# Patient Record
Sex: MALE | Race: WHITE | HISPANIC OR LATINO | Employment: FULL TIME | URBAN - NONMETROPOLITAN AREA
[De-identification: names, ages, dates, MRNs, and addresses within clinical notes are randomized per-mention and may not be internally consistent; named-entity substitution may affect disease eponyms.]

---

## 2017-04-17 ENCOUNTER — COMMUNICATION - GICH (OUTPATIENT)
Dept: INTERNAL MEDICINE | Facility: OTHER | Age: 32
End: 2017-04-17

## 2017-04-17 DIAGNOSIS — S16.1XXA STRAIN OF MUSCLE, FASCIA AND TENDON AT NECK LEVEL, INITIAL ENCOUNTER: ICD-10-CM

## 2017-09-07 ENCOUNTER — OFFICE VISIT - GICH (OUTPATIENT)
Dept: INTERNAL MEDICINE | Facility: OTHER | Age: 32
End: 2017-09-07

## 2017-09-07 ENCOUNTER — HISTORY (OUTPATIENT)
Dept: INTERNAL MEDICINE | Facility: OTHER | Age: 32
End: 2017-09-07

## 2017-09-07 ENCOUNTER — AMBULATORY - GICH (OUTPATIENT)
Dept: INTERNAL MEDICINE | Facility: OTHER | Age: 32
End: 2017-09-07

## 2017-09-07 DIAGNOSIS — E78.5 HYPERLIPIDEMIA: ICD-10-CM

## 2017-09-07 DIAGNOSIS — R20.2 PARESTHESIA OF SKIN: ICD-10-CM

## 2017-09-07 DIAGNOSIS — Z86.39 PERSONAL HISTORY OF OTHER ENDOCRINE, NUTRITIONAL AND METABOLIC DISEASE: ICD-10-CM

## 2017-09-07 DIAGNOSIS — Z72.0 TOBACCO USE: ICD-10-CM

## 2017-09-07 DIAGNOSIS — R73.09 OTHER ABNORMAL GLUCOSE: ICD-10-CM

## 2017-09-07 LAB
ESTIMATED AVERAGE GLUCOSE: 114 MG/DL
HEMOGLOBIN A1C MONITORING (POCT) - HISTORICAL: 5.6 % (ref 4–6.2)

## 2017-09-07 ASSESSMENT — PATIENT HEALTH QUESTIONNAIRE - PHQ9: SUM OF ALL RESPONSES TO PHQ QUESTIONS 1-9: 0

## 2017-09-08 ENCOUNTER — COMMUNICATION - GICH (OUTPATIENT)
Dept: PHARMACY | Facility: OTHER | Age: 32
End: 2017-09-08

## 2017-09-08 DIAGNOSIS — S16.1XXA STRAIN OF MUSCLE, FASCIA AND TENDON AT NECK LEVEL, INITIAL ENCOUNTER: ICD-10-CM

## 2017-12-27 NOTE — PROGRESS NOTES
Patient Information     Patient Name MRN Sex Sanjay Pires [id number] Male 1985      Progress Notes by All Kunz MD at 2017  2:40 PM     Author:  All Kunz MD Service:  (none) Author Type:  Physician     Filed:  2017  5:32 PM Encounter Date:  2017 Status:  Signed     :  All Kunz MD (Physician)            Nursing Notes:   Ness Edgar  2017  3:18 PM  Signed  Patient presents to the clinic for medication management.    Previous A1C is at goal of <8  HEMOGLOBIN A1C MONITORING (POCT) (%)    Date Value   2016 7.6 (H)     Urine microalbumin:creatine: 0.29  Foot exam unknown  Eye exam with in the past 6 months    Patient is a current smoker  Patient is not on a daily aspirin  Patient is on a Statin.  Blood pressure today of   is at the goal of <139/89 for diabetics.    Ness Edgar LPN..............2017 2:58 PM    Sanjay Hoskins presents to clinic today for:   Chief Complaint    Patient presents with      Medication Management     HPI: Mr. Hoskins is a 31 y.o. male who presents today for evaluation of above.     (R73.09) Elevated random blood glucose level  (primary encounter diagnosis)  (E78.5) Dyslipidemia  (Z72.0) Tobacco use  (R20.2) Pins and needles sensation  (Z86.39) History of diabetes mellitus, type II     Diabetes mellitus type 2, currently resolved.  -- Reports that his new job he has lost about 20 pounds.  He was taking combination glipizide and metformin for a while but then he started having hypoglycemia episodes.  His diet has changed substantially.  He's been eating a lot less during the day and and he has been more active.  -- Repeat labs today show hemoglobin A1c now within the normal range.  HEMOGLOBIN A1C MONITORING (POCT) (%)    Date Value   2017 5.6     dyslipidemia, continues on pravastatin.  Seems to be tolerating this well.  Refill today.    Tobacco use, ongoing.  Quarter pack per day.  At this point diabetes has resolved.   Advised patient continue metformin alone, for diabetes prevention.    Pins and needle sensation of the hands and feet -- advised to continue metformin, consider taking some vitamin B-12 or B complex to help with potential B12 deficiency from the metformin.    Mr. Blackman Body mass index is 28.46 kg/(m^2). This is out of the normal range for a 31 y.o. Normal range for ages 18+ is between 18.5 and 24.9. To lose weight we reviewed risks and benefits of appropriate options such as diet, exercise, and medications. Patient's strategy will be  self-directed nutrition plan and self-directed exercise program   BP Readings from Last 1 Encounters:09/07/17 : 138/88  Mr. Blackman blood pressure is out of the normal range for adults. Per JNC-8 guidelines normal adult blood pressure is < 120/80, pre-hypertensive is between 120/80 and 139/89, and hypertension is 140/90 or greater. Risks of hypertension were discussed. Patient's strategy will be weight loss, increased activity and reduced salt intake    Functional Capacity: > 4 METS.   Reports that he can climb a flight of stairs without any chest pain/heaviness or shortness of breath.   Patient reports no current symptoms of fevers, chills, nausea/vomiting.   No cough. No shortness of breath.   No change in bowel/bladder habits. No melena, hematochezia. No Hematuria.   No rashes. No palpitations.  No orthopnea/paroxysmal nocturnal dyspnea   No vision or hearing issues.   No significant mood issues   No bruising.     HUSSEIN:  No flowsheet data found.    PHQ9:  PHQ Depression Screening 8/24/2016 9/7/2017   Date of PHQ exam (doc flow) 8/24/2016 9/7/2017   1. Lack of interest/pleasure 0 - Not at all 0 - Not at all   2. Feeling down/depressed 0 - Not at all 0 - Not at all   PHQ-2 TOTAL SCORE 0 0   3. Trouble sleeping 0 - Not at all 0 - Not at all   4. Decreased energy 0 - Not at all 0 - Not at all   5. Appetite change 0 - Not at all 0 - Not at all   6. Feelings of failure 0 - Not at all 0 -  Not at all   7. Trouble concentrating 0 - Not at all 0 - Not at all   8. Activity level 0 - Not at all 0 - Not at all   9. Hurting yourself 0 - Not at all 0 - Not at all   PHQ-9 TOTAL SCORE 0 0   PHQ-9 Severity Level none none   Functional Impairment not difficult at all not difficult at all   Some recent data might be hidden          I have personally reviewed the past medical history, past surgical history, medications, allergies, family and social history as listed below, on 9/7/2017.    Patient Active Problem List      Diagnosis Date Noted     Tobacco use 09/07/2017     History of diabetes mellitus, type II 09/07/2017     History of neck pain 08/24/2016     Heartburn 09/22/2015     Dyslipidemia 09/22/2015     Past Medical History:     Diagnosis  Date     Dyslipidemia 9/22/2015     History of nicotine dependence 9/2/2015     No past surgical history on file.  Current Outpatient Prescriptions       Medication  Sig Dispense Refill     aspirin (ECOTRIN) 81 mg enteric coated tablet Take 1 tablet by mouth once daily with a meal. -- just a couple times weekly --  0     blood sugar diagnostic strip Dispense item covered by pt ins. 250.02 NIDDM type II, uncontrolled - Test 4 times/day. Reason: High A1C and NEW Diabetes 400 Each 1     blood-glucose meter Dispense item covered by pt ins. 250.02 NIDDM type II, uncontrolled - Test 4 times/day. Reason: High A1C and NEW Diabetes 1 Device 0     cyanocobalamin (VITAMIN B-12) 1,000 mcg tablet Take 1 tablet by mouth once daily. -- For low vitamin B 12 30 tablet 11     cyclobenzaprine (FLEXERIL) 10 mg tablet Take 1 tablet by mouth 3 times daily if needed for Muscle Spasm. 90 tablet 3     lancets Dispense item covered by pt ins. 250.02 NIDDM type II, uncontrolled - Test 4 times/day. Reason: High A1C and NEW Diabetes 400 Each 1     metFORMIN (GLUCOPHAGE) 500 mg tablet Take 1-2 tablets by mouth 2 times daily with meals. - adjust dose based on glucose levels 120 tablet 11      "omeprazole (PRILOSEC) 20 mg Delayed-Release capsule Take 1 capsule by mouth once daily before a meal. 90 capsule 3     pravastatin (PRAVACHOL) 40 mg tablet Take 1 tablet by mouth at bedtime. For Cholesterol and Heart Attack Risk Reduction -- check on cost/coverage vs other statin 30 tablet 11     Allergies      Allergen   Reactions     Amoxicillin  Other - Describe In Comment Field     Too young with onset      Augmentin [Amoxicillin-Pot Clavulanate]  Other - Describe In Comment Field     Patient too young to remember      Suprax [Cefixime]  Other - Describe In Comment Field     Too young when onset      Family History       Problem   Relation Age of Onset     Diabetes  Mother      Good Health  Father      COPD       Arthritis  Father      RA?       Family Status     Relation  Status     Mother Alive     Father Alive     Sister Alive     Social History     Social History        Marital status:  Single     Spouse name: N/A     Number of children:  N/A     Years of education:  N/A     Social History Main Topics          Smoking status:   Current Some Day Smoker      Packs/day:  0.25      Years:  6.00      Last attempt to quit:  3/2/2015      Smokeless tobacco:   Never Used      Alcohol use   3.6 oz/week     6 Standard drinks or equivalent per week        Comment: About once a week, beer       Drug use:   No      Sexual activity:   Not on file      Other Topics  Concern     Seat Belt Yes     Social History Narrative     Lives independently.     Worked at [employer] in [place]    -- working at [employer] in [place].      Pertinent ROS was performed and was negative as noted in HPI above.     EXAM:   Vitals:     09/07/17 1507   BP: 138/88   Pulse: 72   Weight: 90 kg (198 lb 6 oz)   Height: 1.778 m (5' 10\")     BP Readings from Last 3 Encounters:    09/07/17 138/88   08/24/16 132/68   06/01/16 126/66     Wt Readings from Last 3 Encounters:    09/07/17 90 kg (198 lb 6 oz)   08/24/16 97.7 kg (215 lb 6.4 oz) " "  06/01/16 105.9 kg (233 lb 6.4 oz)     Estimated body mass index is 28.46 kg/(m^2) as calculated from the following:    Height as of this encounter: 1.778 m (5' 10\").    Weight as of this encounter: 90 kg (198 lb 6 oz).     EXAM:  Constitutional: Pleasant, alert, appropriate appearance for age. No acute distress  ENT: Normocephalic, Atraumatic, Thyroid without nodules or tenderness  Lymphatic Exam: Non-palpable nodes in neck, clavicular regions  Pulmonary: Lungs are clear to auscultation bilaterally, without wheezes or crackles  Cardiovascular Exam: regular rate and rhythm, no pedal edema  Gastrointestinal Exam: Obese, Soft, non-tender, non-distended, positive bowel sounds  Integument: No abnormal rashes, sores, or ulcerations noted  Neurologic Exam: CN 3-12 grossly intact   Musculoskeletal Exam: Moves upper and lower extremities symmetrically, No focal weakness  Gait and station appear grossly normal  Psychiatric Exam: Awake and Alert, Affect and mood appropriate  Speech is fluent, Thought process is normal    INVESTIGATIONS:  Results for orders placed or performed in visit on 09/07/17      HEMOGLOBIN A1C MONITORING (POCT)      Result  Value Ref Range    HEMOGLOBIN A1C MONITORING (POCT) 5.6 4.0 - 6.2 %    ESTIMATED AVERAGE GLUCOSE  114 mg/dL       ASSESSMENT AND PLAN:  Sanjay was seen today for medication management.    Diagnoses and all orders for this visit:    Elevated random blood glucose level  -     pravastatin (PRAVACHOL) 40 mg tablet; Take 1 tablet by mouth at bedtime. For Cholesterol and Heart Attack Risk Reduction -- check on cost/coverage vs other statin  -     metFORMIN (GLUCOPHAGE) 500 mg tablet; Take 1-2 tablets by mouth 2 times daily with meals. - adjust dose based on glucose levels  -     HEMOGLOBIN A1C MONITORING (POCT); Standing  -     HEMOGLOBIN A1C MONITORING (POCT)    Dyslipidemia  -     pravastatin (PRAVACHOL) 40 mg tablet; Take 1 tablet by mouth at bedtime. For Cholesterol and Heart Attack " Risk Reduction -- check on cost/coverage vs other statin    Tobacco use    Pins and needles sensation  -     cyanocobalamin (VITAMIN B-12) 1,000 mcg tablet; Take 1 tablet by mouth once daily. -- For low vitamin B 12    History of diabetes mellitus, type II    Other orders  -     Cancel: Hgb A1c; Future    lab results and schedule of future lab studies reviewed with patient, reviewed diet, exercise and weight control, very strongly urged to quit smoking to reduce cardiovascular risk, cardiovascular risk and specific lipid/LDL goals reviewed, specific diabetic recommendations low cholesterol diet, weight control and daily exercise discussed, home glucose monitoring emphasized, foot care discussed and Podiatry visits discussed, annual eye examinations at Ophthalmology discussed, glycohemoglobin and other lab monitoring discussed and long term diabetic complications discussed, use of aspirin to prevent MI and TIA's discussed    -- Expected clinical course discussed   -- Medications and their side effects discussed    Sanjay is also recommended to eat a heart-healthy diet, do regular aerobic exercises, maintain a desirable body weight, and avoid tobacco products. These recommendations are from the American Heart Association (AHA) which stresses the importance of lifestyle changes to lower cardiovascular disease risk.     Return in about 3 months (around 12/7/2017) for -- labs in 91+ days with Diabetes clinic appointment with Dr. Kunz 1-2 days later..    Patient Instructions     QUIT SMOKING!!     -- Choose a quit date (within 1 month). Quitting smoking abruptly is more successful than gradually cutting back.   -- Tell everyone about it (friends, family, coworkers)   -- Think about when you smoke the most, and what you'll do during those times (eg when in the car, work breaks, etc)     Consider:   -- Start varenicline (Chantix) 1 week before your quit date   -- Start bupropion (Wellbutrin/Zyban) 1 week before your quit  date -- Welbutrin 1 pill daily for 1 week then 1 pill twice daily      -- Stop smoking on quit date   -- Starting with quit date, use nicotine lozenges/gum as needed for cravings     -- Quit Plan - Call them in the next 1-2 weeks to help you quit smoking.                        [phone] ([phone])                        http://[email or web address]   -- http://smokefree.gov/       A1c today.   Medications refilled.    -- change to regular Metformin (stop glipizide).     -- call when you quit smoking and we will update your chart.       Vitamin B12 deficiency:    Vitamin B12 deficiency can cause numerous symptoms.  Fatigue and malaise, low energy levels, low blood counts or anemia, poor mood or depression, neuropathy or pins and needles/burning sensation of the hands and feet.    -- Omeprazole (Proton Pump Inhibitors in general) and metformin can lower B12 levels (inhibits absorption).    -- START B12 tablet -- once daily.     -- Some people are able to take and absorb oral B12 or B complex tablets.   -- Some people are NOT able to absorb oral B12 very well.    If you decide to proceed with oral B12 replacement, but your B12 levels do not improve, you likely will need to use B12 shots instead.      -- Call with update on this B12 shot in 2 to 4 weeks.     -- If it didn't help, we won't do anymore.    -- If it was helpful, we can order more.             Return for Diabetes labs and clinic follow-up appointment every 3 to 4 months.  --- (Go for about 91 to 100 days)    Schedule lab only appointment --- A few days AFTER: 12/06/17     Schedule clinic appointment with Dr. Kunz -- Same day as labs, or 1-2 days later.     Insurance companies are now grading you and I on your blood sugar control -- Goal is to get your A1c down to 7.9% or lower and NO Smoking!    -- Medicare and most insurance companies, will only cover Hemoglobin A1c labs to be rechecked every 91+ days.      HEMOGLOBIN A1C MONITORING (POCT) (%)    Date Value    08/24/2016 7.6 (H)        Next follow-up appointment with Dr. Kunz should be scheduled:  -- Approximately a few days AFTER: 12/06/17      All Kunz MD

## 2017-12-28 NOTE — PATIENT INSTRUCTIONS
Patient Information     Patient Name MRN Sanjay Lawsno [id number] Male 1985      Patient Instructions by All Kunz MD at 2017  2:40 PM     Author:  All Kunz MD  Service:  (none) Author Type:  Physician     Filed:  2017  3:25 PM  Encounter Date:  2017 Status:  Addendum     :  All Kunz MD (Physician)        Related Notes: Original Note by All Kunz MD (Physician) filed at 2017  3:23 PM            QUIT SMOKING!!     -- Choose a quit date (within 1 month). Quitting smoking abruptly is more successful than gradually cutting back.   -- Tell everyone about it (friends, family, coworkers)   -- Think about when you smoke the most, and what you'll do during those times (eg when in the car, work breaks, etc)     Consider:   -- Start varenicline (Chantix) 1 week before your quit date   -- Start bupropion (Wellbutrin/Zyban) 1 week before your quit date -- Welbutrin 1 pill daily for 1 week then 1 pill twice daily      -- Stop smoking on quit date   -- Starting with quit date, use nicotine lozenges/gum as needed for cravings     -- Quit Plan - Call them in the next 1-2 weeks to help you quit smoking.                        [phone]                        http://www.quitplan.com   -- http://smokefree.gov/       A1c today.   Medications refilled.    -- change to regular Metformin (stop glipizide).     -- call when you quit smoking and we will update your chart.       Vitamin B12 deficiency:    Vitamin B12 deficiency can cause numerous symptoms.  Fatigue and malaise, low energy levels, low blood counts or anemia, poor mood or depression, neuropathy or pins and needles/burning sensation of the hands and feet.    -- Omeprazole (Proton Pump Inhibitors in general) and metformin can lower B12 levels (inhibits absorption).    -- START B12 tablet -- once daily.     -- Some people are able to take and absorb oral B12 or B complex tablets.   -- Some people  are NOT able to absorb oral B12 very well.    If you decide to proceed with oral B12 replacement, but your B12 levels do not improve, you likely will need to use B12 shots instead.      -- Call with update on this B12 shot in 2 to 4 weeks.     -- If it didn't help, we won't do anymore.    -- If it was helpful, we can order more.             Return for Diabetes labs and clinic follow-up appointment every 3 to 4 months.  --- (Go for about 91 to 100 days)    Schedule lab only appointment --- A few days AFTER: 12/06/17     Schedule clinic appointment with Dr. Kunz -- Same day as labs, or 1-2 days later.     Insurance companies are now grading you and I on your blood sugar control -- Goal is to get your A1c down to 7.9% or lower and NO Smoking!    -- Medicare and most insurance companies, will only cover Hemoglobin A1c labs to be rechecked every 91+ days.      HEMOGLOBIN A1C MONITORING (POCT) (%)    Date Value   08/24/2016 7.6 (H)        Next follow-up appointment with Dr. Kunz should be scheduled:  -- Approximately a few days AFTER: 12/06/17

## 2017-12-28 NOTE — TELEPHONE ENCOUNTER
Patient Information     Patient Name MRN Sex Sanjay Pires [id number] Male 1985      Telephone Encounter by Ruby Ramon RPh at 2017  2:07 PM     Author:  Ruby Ramon RPh Service:  (none) Author Type:  PHARM- Pharmacist     Filed:  2017  2:08 PM Encounter Date:  2017 Status:  Signed     :  Ruby Ramon RPh (PHARM- Pharmacist)            Pt would like a refill of cyclobenzaprine. Please send to [facility] pharmacy if approved. Thanks.

## 2017-12-30 NOTE — NURSING NOTE
Patient Information     Patient Name MRN Sex Sanjay Pires [id number] Male 1985      Nursing Note by Ness Edgar at 2017  2:40 PM     Author:  Ness Edgar Service:  (none) Author Type:  (none)     Filed:  2017  3:18 PM Encounter Date:  2017 Status:  Signed     :  Ness Edgar            Patient presents to the clinic for medication management.    Previous A1C is at goal of <8  HEMOGLOBIN A1C MONITORING (POCT) (%)    Date Value   2016 7.6 (H)     Urine microalbumin:creatine: 0.29  Foot exam unknown  Eye exam with in the past 6 months    Patient is a current smoker  Patient is not on a daily aspirin  Patient is on a Statin.  Blood pressure today of   is at the goal of <139/89 for diabetics.    Ness Edgar LPN..............2017 2:58 PM

## 2018-01-04 NOTE — TELEPHONE ENCOUNTER
Patient Information     Patient Name MRN Sex Sanjay Pires [id number] Male 1985      Telephone Encounter by Noel Spangler RN at 2017  8:34 AM     Author:  Noel Spangler RN Service:  (none) Author Type:  NURS- Registered Nurse     Filed:  2017  8:36 AM Encounter Date:  2017 Status:  Signed     :  Noel Spangler RN (NURS- Registered Nurse)            Medication is not on refill protocol. Unable to complete prescription refill per RN Medication Refill Policy.................... NOEL SPANGLER RN ....................  2017   8:35 AM        This is a Refill request from: [facility]  Name of Medication: Flexeril 10mg  Quantity requested: 30  Last fill date: 2/15/17  Last visit with BENOIT KUNZ was on: 11/10/2015 in [facility]  PCP:  Benoit Kunz MD  Controlled Substance Agreement:  none   Diagnosis r/t this medication request: cervical strain

## 2018-01-26 VITALS
SYSTOLIC BLOOD PRESSURE: 138 MMHG | BODY MASS INDEX: 28.4 KG/M2 | HEIGHT: 70 IN | DIASTOLIC BLOOD PRESSURE: 88 MMHG | WEIGHT: 198.38 LBS | HEART RATE: 72 BPM

## 2018-02-01 ASSESSMENT — PATIENT HEALTH QUESTIONNAIRE - PHQ9: SUM OF ALL RESPONSES TO PHQ QUESTIONS 1-9: 0

## 2018-02-05 ENCOUNTER — HISTORY (OUTPATIENT)
Dept: INTERNAL MEDICINE | Facility: OTHER | Age: 33
End: 2018-02-05

## 2018-02-05 ENCOUNTER — OFFICE VISIT - GICH (OUTPATIENT)
Dept: INTERNAL MEDICINE | Facility: OTHER | Age: 33
End: 2018-02-05

## 2018-02-05 ENCOUNTER — HOSPITAL ENCOUNTER (OUTPATIENT)
Dept: RADIOLOGY | Facility: OTHER | Age: 33
End: 2018-02-05
Attending: INTERNAL MEDICINE

## 2018-02-05 DIAGNOSIS — M25.522 PAIN IN LEFT ELBOW: ICD-10-CM

## 2018-02-05 DIAGNOSIS — S56.912A STRAIN OF UNSPECIFIED MUSCLES, FASCIA AND TENDONS AT FOREARM LEVEL, LEFT ARM, INITIAL ENCOUNTER: ICD-10-CM

## 2018-02-05 DIAGNOSIS — M77.12 LATERAL EPICONDYLITIS OF LEFT ELBOW: ICD-10-CM

## 2018-02-05 ASSESSMENT — PATIENT HEALTH QUESTIONNAIRE - PHQ9: SUM OF ALL RESPONSES TO PHQ QUESTIONS 1-9: 0

## 2018-02-07 ENCOUNTER — DOCUMENTATION ONLY (OUTPATIENT)
Dept: FAMILY MEDICINE | Facility: OTHER | Age: 33
End: 2018-02-07

## 2018-02-07 PROBLEM — Z86.39 HISTORY OF DIABETES MELLITUS, TYPE II: Status: ACTIVE | Noted: 2017-09-07

## 2018-02-07 PROBLEM — Z72.0 TOBACCO USE: Status: ACTIVE | Noted: 2017-09-07

## 2018-02-07 RX ORDER — ASPIRIN 81 MG/1
1 TABLET ORAL
COMMUNITY
Start: 2017-09-07

## 2018-02-07 RX ORDER — BLOOD-GLUCOSE METER
EACH MISCELLANEOUS
COMMUNITY
Start: 2015-09-22 | End: 2020-06-05

## 2018-02-07 RX ORDER — PRAVASTATIN SODIUM 40 MG
1 TABLET ORAL AT BEDTIME
COMMUNITY
Start: 2017-09-07 | End: 2018-06-04

## 2018-02-07 RX ORDER — CYCLOBENZAPRINE HCL 10 MG
1 TABLET ORAL 3 TIMES DAILY PRN
COMMUNITY
Start: 2017-09-08 | End: 2018-10-02

## 2018-02-09 ENCOUNTER — DOCUMENTATION ONLY (OUTPATIENT)
Dept: FAMILY MEDICINE | Facility: OTHER | Age: 33
End: 2018-02-09

## 2018-02-09 VITALS
BODY MASS INDEX: 31.01 KG/M2 | SYSTOLIC BLOOD PRESSURE: 138 MMHG | HEART RATE: 80 BPM | DIASTOLIC BLOOD PRESSURE: 82 MMHG | WEIGHT: 216.13 LBS

## 2018-02-09 DIAGNOSIS — M25.522 ELBOW PAIN, LEFT: Primary | ICD-10-CM

## 2018-02-10 ASSESSMENT — PATIENT HEALTH QUESTIONNAIRE - PHQ9: SUM OF ALL RESPONSES TO PHQ QUESTIONS 1-9: 0

## 2018-02-13 NOTE — PROGRESS NOTES
Patient Information     Patient Name MRN Sex Sanjay Pires [id number] Male 1985      Progress Notes by All Kunz MD at 2018 11:00 AM     Author:  All Kunz MD Service:  (none) Author Type:  Physician     Filed:  2018  5:38 PM Encounter Date:  2018 Status:  Signed     :  All Kunz MD (Physician)            Nursing Notes:   Ness Edgar  2018 11:13 AM  Signed  Patient presents to the clinic for left elbow discomfort noted over the past several weeks.  At first redness with mild swelling of the left forearm was present and then subsided.  Patient currently reports that pain radiates to the left shoulder.  Patient denies trauma at this time.      Previous A1C is at goal of <8  HEMOGLOBIN A1C MONITORING (POCT) (%)    Date Value   2017 5.6     Urine microalbumin:creatine: 0.29  Foot exam unknown-declines at this time.  Eye exam with in the past year    Tobacco User yes  Patient is on a daily aspirin  Patient is on a Statin.  Blood pressure today of   is at the goal of <139/89 for diabetics.    Ness ALCALA Herve LPN..............2018 10:53 AM      Sanjay Hoskins presents to clinic today for:   Chief Complaint    Patient presents with      Arm Pain/problem     HPI: Mr. Hoskins is a 32 y.o. male who presents today for evaluation of above.     (M25.522) Left elbow pain  (primary encounter diagnosis)  (S56.912A) Muscle strain of left forearm, initial encounter  (M77.12) Lateral epicondylitis of left elbow     Patient presents for evaluation of left elbow pain.  States that overall it started maybe a month ago.  Initially more in the middle part of his volar forearm/antecubital area.  States around 3 weeks ago it seemed to get a little bit worse.  Now more in the lateral aspect of his forearm overlying the radial head.  Pronation and supination seem to worsen the pain.  Especially supinating his wrist with some force or with hand dorsiflexion.    States that he did  catch himself well following 2 or 3 weeks ago, had his arm outstretched.  He is worried about injury and presented for evaluation.  States that the pain has been gradually worsening.    Currently reports left elbow pain 6 out of 10.  Has some radiation up the arm and into the forearm.  Has been using ibuprofen, Tylenol, ice packs.  All of this has helped somewhat.  Rest also helps.    Mr. Blackman Body mass index is 31.01 kg/(m^2). This is out of the normal range for a 32 y.o. Normal range for ages 18+ is between 18.5 and 24.9. To lose weight we reviewed risks and benefits of appropriate options such as diet, exercise, and medications. Patient's strategy will be  none; patient is not ready to act   BP Readings from Last 1 Encounters:02/05/18 : 138/82  Mr. Blackman blood pressure is out of the normal range for adults. Per JNC-8 guidelines normal adult blood pressure is < 120/80, pre-hypertensive is between 120/80 and 139/89, and hypertension is 140/90 or greater. Risks of hypertension were discussed. Patient's strategy will be reduced salt intake    Functional Capacity: > 4 METS.   Reports that he can climb a flight of stairs without any chest pain/heaviness or shortness of breath.   Patient reports no current symptoms of fevers, chills, nausea/vomiting.   No cough. No shortness of breath.   No change in bowel/bladder habits. No melena, hematochezia. No Hematuria.   No rashes. No palpitations.  No orthopnea/paroxysmal nocturnal dyspnea   No vision or hearing issues.   No significant mood issues   No bruising.     HUSSEIN:  No flowsheet data found.    PHQ9:  PHQ Depression Screening 9/7/2017 2/5/2018   Date of PHQ exam (doc flow) 9/7/2017 2/5/2018   1. Lack of interest/pleasure 0 - Not at all 0 - Not at all   2. Feeling down/depressed 0 - Not at all 0 - Not at all   PHQ-2 TOTAL SCORE 0 0   3. Trouble sleeping 0 - Not at all 0 - Not at all   4. Decreased energy 0 - Not at all 0 - Not at all   5. Appetite change 0 - Not at all  0 - Not at all   6. Feelings of failure 0 - Not at all 0 - Not at all   7. Trouble concentrating 0 - Not at all 0 - Not at all   8. Activity level 0 - Not at all 0 - Not at all   9. Hurting yourself 0 - Not at all 0 - Not at all   PHQ-9 TOTAL SCORE 0 0   PHQ-9 Severity Level none none   Functional Impairment not difficult at all not difficult at all   Some recent data might be hidden          I have personally reviewed the past medical history, past surgical history, medications, allergies, family and social history as listed below, on 2/5/2018.    Patient Active Problem List      Diagnosis Date Noted     Tobacco use 09/07/2017     History of diabetes mellitus, type II 09/07/2017     History of neck pain 08/24/2016     Heartburn 09/22/2015     Dyslipidemia 09/22/2015     Past Medical History:     Diagnosis  Date     Dyslipidemia 9/22/2015     History of nicotine dependence 9/2/2015     No past surgical history on file.  Current Outpatient Prescriptions       Medication  Sig Dispense Refill     aspirin (ECOTRIN) 81 mg enteric coated tablet Take 1 tablet by mouth once daily with a meal. -- just a couple times weekly --  0     blood sugar diagnostic strip Dispense item covered by pt ins. 250.02 NIDDM type II, uncontrolled - Test 4 times/day. Reason: High A1C and NEW Diabetes 400 Each 1     blood-glucose meter Dispense item covered by pt ins. 250.02 NIDDM type II, uncontrolled - Test 4 times/day. Reason: High A1C and NEW Diabetes 1 Device 0     cyanocobalamin (VITAMIN B-12) 1,000 mcg tablet Take 1 tablet by mouth once daily. -- For low vitamin B 12 30 tablet 11     cyclobenzaprine (FLEXERIL) 10 mg tablet Take 1 tablet by mouth 3 times daily if needed for Muscle Spasm. 90 tablet 3     lancets Dispense item covered by pt ins. 250.02 NIDDM type II, uncontrolled - Test 4 times/day. Reason: High A1C and NEW Diabetes 400 Each 1     metFORMIN (GLUCOPHAGE) 500 mg tablet Take 1-2 tablets by mouth 2 times daily with meals. -  adjust dose based on glucose levels 120 tablet 11     omeprazole (PRILOSEC) 20 mg Delayed-Release capsule Take 1 capsule by mouth once daily before a meal. 90 capsule 3     pravastatin (PRAVACHOL) 40 mg tablet Take 1 tablet by mouth at bedtime. For Cholesterol and Heart Attack Risk Reduction -- check on cost/coverage vs other statin 30 tablet 11     Allergies      Allergen   Reactions     Amoxicillin  Other - Describe In Comment Field     Too young with onset      Augmentin [Amoxicillin-Pot Clavulanate]  Other - Describe In Comment Field     Patient too young to remember      Suprax [Cefixime]  Other - Describe In Comment Field     Too young when onset      Family History       Problem   Relation Age of Onset     Diabetes  Mother      Good Health  Father      COPD       Arthritis  Father      RA?       Family Status     Relation  Status     Mother Alive     Father Alive     Sister Alive     Social History     Social History        Marital status:  Single     Spouse name: N/A     Number of children:  N/A     Years of education:  N/A     Social History Main Topics          Smoking status:   Current Some Day Smoker      Packs/day:  0.25      Years:  6.00      Last attempt to quit:  3/2/2015      Smokeless tobacco:   Never Used      Alcohol use   3.6 oz/week     6 Standard drinks or equivalent per week        Comment: About once a week, beer       Drug use:   No      Sexual activity:   Not on file      Other Topics  Concern     Seat Belt Yes     Social History Narrative     Lives independently.     Worked at [employer] in [place]    -- working at [employer] in [place].      Pertinent ROS was performed and was negative as noted in HPI above.     EXAM:   Vitals:     02/05/18 1054   BP: 138/82   Cuff Site: Right Arm   Position: Sitting   Cuff Size: Adult Regular   Pulse: 80   Weight: 98 kg (216 lb 2 oz)     BP Readings from Last 3 Encounters:    02/05/18 138/82   09/07/17 138/88   08/24/16 132/68     Wt Readings  "from Last 3 Encounters:    02/05/18 98 kg (216 lb 2 oz)   09/07/17 90 kg (198 lb 6 oz)   08/24/16 97.7 kg (215 lb 6.4 oz)     Estimated body mass index is 31.01 kg/(m^2) as calculated from the following:    Height as of 9/7/17: 1.778 m (5' 10\").    Weight as of this encounter: 98 kg (216 lb 2 oz).     EXAM:  Constitutional: Healthy, alert, cooperative, no apparent distress  Lymphatic Exam: Non-palpable nodes in neck, clavicular regions  Pulmonary: Lungs are clear to auscultation bilaterally, without wheezes or crackles  Cardiovascular Exam: regular rate and rhythm, no pedal edema  Gastrointestinal Exam: Obese  Soft, non-tender, non-distended, positive bowel sounds  Integument: No abnormal rashes, sores, or ulcerations noted  Neurologic Exam: CN 3-12 grossly intact   Musculoskeletal Exam: left radial head tenderness. Lateral epicondyle tenderness and extensor muscle tenderness to palpation. No crepitus or obvious deformity. Gait and station appear grossly normal  Psychiatric Exam: Awake and Alert, Affect and mood appropriate  Speech is fluent, Thought process is normal    INVESTIGATIONS:  Results for orders placed or performed in visit on 09/07/17      HEMOGLOBIN A1C MONITORING (POCT)      Result  Value Ref Range    HEMOGLOBIN A1C MONITORING (POCT) 5.6 4.0 - 6.2 %    ESTIMATED AVERAGE GLUCOSE  114 mg/dL     2/5/2018 - PROCEDURE:  XR ELBOW 3 VIEWS LEFT     HISTORY: Left elbow pain.     COMPARISON:  None.     TECHNIQUE:  3 views of the left elbow were obtained.     FINDINGS:  No fracture or dislocation is identified. The joint spaces are preserved.       IMPRESSION: No acute fracture.       Electronically Signed By: Lakshmi Foreman M.D. on 2/5/2018 12:44 PM    ASSESSMENT AND PLAN:  Sanjay was seen today for arm pain/problem.    Diagnoses and all orders for this visit:    Left elbow pain  -     XR ELBOW 3 VIEWS LEFT; Future  -     AMB CONSULT TO ORTHOPEDICS - AFFILIATE ONLY; Future    Muscle strain of left forearm, " initial encounter  -     XR ELBOW 3 VIEWS LEFT; Future    Lateral epicondylitis of left elbow  -     XR ELBOW 3 VIEWS LEFT; Future      recommended sodium restriction    -- Expected clinical course discussed   -- Medications and their side effects discussed    Return if symptoms worsen or fail to improve.    Patient Instructions   1. Left elbow pain  - XR ELBOW 3 VIEWS LEFT; Future  - AMB CONSULT TO ORTHOPEDICS - AFFILIATE ONLY; Future    Orthopedic referral sent  - they will call with date/time of appointment.      Call [phone] or [phone]  Otherwise -- [phone]  - or -  [phone]     -- To schedule an appointment with the orthopedic clinic:   -- Dr. Allen or    -- or Sports Medicine - Dr. Chiang.       2. Muscle strain of left forearm, initial encounter  3. Lateral epicondylitis of left elbow  - XR ELBOW 3 VIEWS LEFT; Future    The patient is advised to apply ice or cold packs intermittently as needed to relieve pain.    Ibuprofen 200 mg tablet - take with food -- 3 or 4 times daily. Up to 3 tablets per dose - maximum 12 per day.    --- Or ---     Naproxen 220 to 440 mg tablet(s) - take 220 to 440 mg with food Twice daily as needed for pain.      ------- in addition to -------    Tylenol 500 mg tablet - 1 or 2 every 4 to 6 hours as needed for pain - maximum 8 per day.     --- Or ---     Tylenol Arthritis 650 mg tablet  -- up to every 4 times daily.    --- Maximum total Tylenol in 24 hours -- is 4,000 mg.     Pain Control     Use ice!  Ice keeps the swelling down and swelling is what causes pain.  Never apply ice directly to the skin.  Wrap it in a towel or cloth.  Apply ice 20 minutes on and 20 minutes off for pain control.  Use as needed.     Take tylenol 325 mg by mouth with food every 4 hours as needed for pain.   Or   Ibuprofen 400 mg by mouth with food every 4 hours as needed for pain.      Do not take tylenol if you have a history of heavy drinking , hepatits C or liver problems.    Do  not take ibuprofen if you have a history of stomach ulcers/problems, bleeding problem or kidney issues.     See printed exercises.     Return as needed for follow-up with Dr. Kunz.    Clinic : [phone]  Appointment line: [phone]      All Kunz MD

## 2018-02-13 NOTE — PATIENT INSTRUCTIONS
Patient Information     Patient Name MRN Sex Sanjay Pires [id number] Male 1985      Patient Instructions by All Kunz MD at 2018 11:00 AM     Author:  All Kunz MD  Service:  (none) Author Type:  Physician     Filed:  2018 11:19 AM  Encounter Date:  2018 Status:  Addendum     :  All Kunz MD (Physician)        Related Notes: Original Note by All Kunz MD (Physician) filed at 2018 11:18 AM            1. Left elbow pain  - XR ELBOW 3 VIEWS LEFT; Future  - AMB CONSULT TO ORTHOPEDICS - AFFILIATE ONLY; Future    Orthopedic referral sent  - they will call with date/time of appointment.      Call [phone] or [phone]  Otherwise -- [phone]  - or -  [phone]     -- To schedule an appointment with the orthopedic clinic:   -- Dr. Allen or    -- or Sports Medicine - Dr. Chiang.       2. Muscle strain of left forearm, initial encounter  3. Lateral epicondylitis of left elbow  - XR ELBOW 3 VIEWS LEFT; Future    The patient is advised to apply ice or cold packs intermittently as needed to relieve pain.    Ibuprofen 200 mg tablet - take with food -- 3 or 4 times daily. Up to 3 tablets per dose - maximum 12 per day.    --- Or ---     Naproxen 220 to 440 mg tablet(s) - take 220 to 440 mg with food Twice daily as needed for pain.      ------- in addition to -------    Tylenol 500 mg tablet - 1 or 2 every 4 to 6 hours as needed for pain - maximum 8 per day.     --- Or ---     Tylenol Arthritis 650 mg tablet  -- up to every 4 times daily.    --- Maximum total Tylenol in 24 hours -- is 4,000 mg.     Pain Control     Use ice!  Ice keeps the swelling down and swelling is what causes pain.  Never apply ice directly to the skin.  Wrap it in a towel or cloth.  Apply ice 20 minutes on and 20 minutes off for pain control.  Use as needed.     Take tylenol 325 mg by mouth with food every 4 hours as needed for pain.   Or   Ibuprofen 400 mg by mouth with  food every 4 hours as needed for pain.      Do not take tylenol if you have a history of heavy drinking , hepatits C or liver problems.    Do not take ibuprofen if you have a history of stomach ulcers/problems, bleeding problem or kidney issues.     See printed exercises.     Return as needed for follow-up with Dr. Kunz.    Clinic : [phone]  Appointment line: [phone]

## 2018-02-13 NOTE — NURSING NOTE
Patient Information     Patient Name MRN Sex Sanjay Pires [id number] Male 1985      Nursing Note by Ness Edgar at 2018 11:00 AM     Author:  Ness Edgar Service:  (none) Author Type:  (none)     Filed:  2018 11:13 AM Encounter Date:  2018 Status:  Signed     :  Ness Edgar            Patient presents to the clinic for left elbow discomfort noted over the past several weeks.  At first redness with mild swelling of the left forearm was present and then subsided.  Patient currently reports that pain radiates to the left shoulder.  Patient denies trauma at this time.      Previous A1C is at goal of <8  HEMOGLOBIN A1C MONITORING (POCT) (%)    Date Value   2017 5.6     Urine microalbumin:creatine: 0.29  Foot exam unknown-declines at this time.  Eye exam with in the past year    Tobacco User yes  Patient is on a daily aspirin  Patient is on a Statin.  Blood pressure today of   is at the goal of <139/89 for diabetics.    Ness Edgar LPN..............2018 10:53 AM

## 2018-06-04 ENCOUNTER — OFFICE VISIT (OUTPATIENT)
Dept: PEDIATRICS | Facility: OTHER | Age: 33
End: 2018-06-04
Attending: INTERNAL MEDICINE

## 2018-06-04 VITALS
HEART RATE: 82 BPM | SYSTOLIC BLOOD PRESSURE: 142 MMHG | WEIGHT: 206 LBS | HEIGHT: 70 IN | DIASTOLIC BLOOD PRESSURE: 90 MMHG | BODY MASS INDEX: 29.49 KG/M2 | TEMPERATURE: 98.2 F

## 2018-06-04 DIAGNOSIS — R20.2 TINGLING IN EXTREMITIES: ICD-10-CM

## 2018-06-04 DIAGNOSIS — Z87.891 HISTORY OF TOBACCO USE: ICD-10-CM

## 2018-06-04 DIAGNOSIS — E11.9 CONTROLLED TYPE 2 DIABETES MELLITUS WITHOUT COMPLICATION, WITHOUT LONG-TERM CURRENT USE OF INSULIN (H): Primary | ICD-10-CM

## 2018-06-04 DIAGNOSIS — K21.9 GASTROESOPHAGEAL REFLUX DISEASE, ESOPHAGITIS PRESENCE NOT SPECIFIED: ICD-10-CM

## 2018-06-04 DIAGNOSIS — I10 ESSENTIAL HYPERTENSION: ICD-10-CM

## 2018-06-04 DIAGNOSIS — E66.3 OVERWEIGHT: ICD-10-CM

## 2018-06-04 DIAGNOSIS — E78.5 DYSLIPIDEMIA: ICD-10-CM

## 2018-06-04 PROBLEM — Z86.39 HISTORY OF DIABETES MELLITUS, TYPE II: Status: RESOLVED | Noted: 2017-09-07 | Resolved: 2018-06-04

## 2018-06-04 PROBLEM — Z72.0 TOBACCO USE: Status: RESOLVED | Noted: 2017-09-07 | Resolved: 2018-06-04

## 2018-06-04 LAB
ALBUMIN SERPL-MCNC: 4.2 G/DL (ref 3.5–5.7)
ALP SERPL-CCNC: 97 U/L (ref 34–104)
ALT SERPL W P-5'-P-CCNC: 54 U/L (ref 7–52)
ANION GAP SERPL CALCULATED.3IONS-SCNC: 9 MMOL/L (ref 3–14)
AST SERPL W P-5'-P-CCNC: 31 U/L (ref 13–39)
BASOPHILS # BLD AUTO: 0.1 10E9/L (ref 0–0.2)
BASOPHILS NFR BLD AUTO: 0.9 %
BILIRUB SERPL-MCNC: 0.9 MG/DL (ref 0.3–1)
BUN SERPL-MCNC: 9 MG/DL (ref 7–25)
CALCIUM SERPL-MCNC: 9.2 MG/DL (ref 8.6–10.3)
CHLORIDE SERPL-SCNC: 104 MMOL/L (ref 98–107)
CHOLEST SERPL-MCNC: 174 MG/DL
CO2 SERPL-SCNC: 27 MMOL/L (ref 21–31)
CREAT SERPL-MCNC: 0.84 MG/DL (ref 0.7–1.3)
DEPRECATED CALCIDIOL+CALCIFEROL SERPL-MC: 17.1 NG/ML
DIFFERENTIAL METHOD BLD: NORMAL
EOSINOPHIL # BLD AUTO: 0.3 10E9/L (ref 0–0.7)
EOSINOPHIL NFR BLD AUTO: 4.2 %
ERYTHROCYTE [DISTWIDTH] IN BLOOD BY AUTOMATED COUNT: 12.4 % (ref 10–15)
GFR SERPL CREATININE-BSD FRML MDRD: >90 ML/MIN/1.7M2
GLUCOSE SERPL-MCNC: 122 MG/DL (ref 70–105)
HBA1C MFR BLD: 6.7 % (ref 4–6)
HCT VFR BLD AUTO: 41.2 % (ref 40–53)
HDLC SERPL-MCNC: 47 MG/DL (ref 23–92)
HGB BLD-MCNC: 14.6 G/DL (ref 13.3–17.7)
IMM GRANULOCYTES # BLD: 0 10E9/L (ref 0–0.4)
IMM GRANULOCYTES NFR BLD: 0.6 %
LDLC SERPL CALC-MCNC: 92 MG/DL
LYMPHOCYTES # BLD AUTO: 2.4 10E9/L (ref 0.8–5.3)
LYMPHOCYTES NFR BLD AUTO: 34 %
MCH RBC QN AUTO: 28.1 PG (ref 26.5–33)
MCHC RBC AUTO-ENTMCNC: 35.4 G/DL (ref 31.5–36.5)
MCV RBC AUTO: 79 FL (ref 78–100)
MONOCYTES # BLD AUTO: 0.3 10E9/L (ref 0–1.3)
MONOCYTES NFR BLD AUTO: 4.6 %
NEUTROPHILS # BLD AUTO: 3.9 10E9/L (ref 1.6–8.3)
NEUTROPHILS NFR BLD AUTO: 55.7 %
NONHDLC SERPL-MCNC: 127 MG/DL
PLATELET # BLD AUTO: 204 10E9/L (ref 150–450)
POTASSIUM SERPL-SCNC: 3.8 MMOL/L (ref 3.5–5.1)
PROT SERPL-MCNC: 6.8 G/DL (ref 6.4–8.9)
RBC # BLD AUTO: 5.19 10E12/L (ref 4.4–5.9)
SODIUM SERPL-SCNC: 140 MMOL/L (ref 134–144)
TRIGL SERPL-MCNC: 174 MG/DL
TSH SERPL DL<=0.05 MIU/L-ACNC: 1.86 IU/ML (ref 0.34–5.6)
VIT B12 SERPL-MCNC: 662 PG/ML (ref 180–914)
WBC # BLD AUTO: 6.9 10E9/L (ref 4–11)

## 2018-06-04 PROCEDURE — 99214 OFFICE O/P EST MOD 30 MIN: CPT | Performed by: INTERNAL MEDICINE

## 2018-06-04 PROCEDURE — 85025 COMPLETE CBC W/AUTO DIFF WBC: CPT | Performed by: INTERNAL MEDICINE

## 2018-06-04 PROCEDURE — 82306 VITAMIN D 25 HYDROXY: CPT | Performed by: INTERNAL MEDICINE

## 2018-06-04 PROCEDURE — 36415 COLL VENOUS BLD VENIPUNCTURE: CPT | Performed by: INTERNAL MEDICINE

## 2018-06-04 PROCEDURE — 80053 COMPREHEN METABOLIC PANEL: CPT | Performed by: INTERNAL MEDICINE

## 2018-06-04 PROCEDURE — 84443 ASSAY THYROID STIM HORMONE: CPT | Performed by: INTERNAL MEDICINE

## 2018-06-04 PROCEDURE — 80061 LIPID PANEL: CPT | Performed by: INTERNAL MEDICINE

## 2018-06-04 PROCEDURE — 83036 HEMOGLOBIN GLYCOSYLATED A1C: CPT | Performed by: INTERNAL MEDICINE

## 2018-06-04 PROCEDURE — 82607 VITAMIN B-12: CPT | Performed by: INTERNAL MEDICINE

## 2018-06-04 RX ORDER — PRAVASTATIN SODIUM 40 MG
40 TABLET ORAL AT BEDTIME
Qty: 90 TABLET | Refills: 4 | Status: SHIPPED | OUTPATIENT
Start: 2018-06-04 | End: 2020-06-05

## 2018-06-04 RX ORDER — LANCETS 23 GAUGE
EACH MISCELLANEOUS
COMMUNITY
Start: 2015-09-22 | End: 2020-06-05

## 2018-06-04 RX ORDER — LISINOPRIL 5 MG/1
5 TABLET ORAL DAILY
Qty: 90 TABLET | Refills: 3 | Status: SHIPPED | OUTPATIENT
Start: 2018-06-04 | End: 2019-07-01

## 2018-06-04 ASSESSMENT — ANXIETY QUESTIONNAIRES
1. FEELING NERVOUS, ANXIOUS, OR ON EDGE: NOT AT ALL
7. FEELING AFRAID AS IF SOMETHING AWFUL MIGHT HAPPEN: NOT AT ALL
3. WORRYING TOO MUCH ABOUT DIFFERENT THINGS: NOT AT ALL
GAD7 TOTAL SCORE: 0
2. NOT BEING ABLE TO STOP OR CONTROL WORRYING: NOT AT ALL
6. BECOMING EASILY ANNOYED OR IRRITABLE: NOT AT ALL
5. BEING SO RESTLESS THAT IT IS HARD TO SIT STILL: NOT AT ALL

## 2018-06-04 ASSESSMENT — PATIENT HEALTH QUESTIONNAIRE - PHQ9: 5. POOR APPETITE OR OVEREATING: NOT AT ALL

## 2018-06-04 ASSESSMENT — PAIN SCALES - GENERAL: PAINLEVEL: NO PAIN (0)

## 2018-06-04 NOTE — PATIENT INSTRUCTIONS
.Aspects of Diabetes we can improve:  Hemoglobin A1c No results found for: A1C Goal range is under 8. Best is 6.5 to 7   Blood Pressure 168/98 Goal to keep less than 140/90   Tobacco  reports that he quit smoking about 2 months ago. He has a 1.50 pack-year smoking history. He has never used smokeless tobacco. Goal to abstain from tobacco   Aspirin yes Aspirin reduces risk of heart disease and stroke   ACE/ARB Start lisinopril These medications reduce risk of kidney disease   Cholesterol Pravastatin Statins reduce risk of heart disease and stroke   Eye Exam annually Annual diabetic eye exam   Healthy weight Body mass index is 29.56 kg/(m^2). Goal BMI under 30, best is under 25.      -- I'm trying to exercise daily (goal at least 20 min/day) with moderate aerobic activity   -- Eat healthy (resources from ADA at http://www.diabetes.org/)   -- I'm taking good care of my feet. Consider seeing the Podiatrist   -- Check blood sugars as directed, record in log book and bring to every appointment   -- Next diabetes lab draw: 3 months   -- Next diabetes office visit: 1 months        Your BMI is Body mass index is 29.56 kg/(m^2).  (BMI ranges: Normal 18.5 - 25, Overweight 25 - 30, Obesity greater than 30,     Morbid Obesity greater than 40 or greater than 35 with associated conditions.)    Facts about losing weight:   -- Overweight and Obesity increase your risk for developing diabetes, high blood pressure and stroke, and shorten your life.   -- 90% of weight loss comes from dietary changes, only 10% from exercise    What should I do?   -- Work on 5-10% weight loss   -- Focus on a few healthy dietary changes   -- Eat more fresh fruits and vegetables, and fewer carbohydrates   -- Cut out all calorie-containing beverages (milk, juice, alcohol, etc)   -- Exercise every day   -- Weigh yourself once a week   -- Consider the DASH Diet (http://http://bit.ly/DASHDiet - redirects to the NIH)   -- Consider Weight Watchers  (http://www.weightTrendyta.com)   -- Consider My Fitness Pal (iOS, Android, [email or web address] [email or web address])

## 2018-06-04 NOTE — MR AVS SNAPSHOT
After Visit Summary   6/4/2018    Sanjay Hoskins    MRN: [id number]           Patient Information     Date Of Birth          1985        Visit Information        Provider Department      6/4/2018 1:30 PM Radhames Morris MD [facility]        Today's Diagnoses     Controlled type 2 diabetes mellitus without complication, without long-term current use of insulin (H)    -  1    History of tobacco use        Essential hypertension        Dyslipidemia        Gastroesophageal reflux disease, esophagitis presence not specified        Tingling in extremities        Overweight          Care Instructions    .Aspects of Diabetes we can improve:  Hemoglobin A1c No results found for: A1C Goal range is under 8. Best is 6.5 to 7   Blood Pressure 168/98 Goal to keep less than 140/90   Tobacco  reports that he quit smoking about 2 months ago. He has a 1.50 pack-year smoking history. He has never used smokeless tobacco. Goal to abstain from tobacco   Aspirin yes Aspirin reduces risk of heart disease and stroke   ACE/ARB Start lisinopril These medications reduce risk of kidney disease   Cholesterol Pravastatin Statins reduce risk of heart disease and stroke   Eye Exam annually Annual diabetic eye exam   Healthy weight Body mass index is 29.56 kg/(m^2). Goal BMI under 30, best is under 25.      -- I'm trying to exercise daily (goal at least 20 min/day) with moderate aerobic activity   -- Eat healthy (resources from ADA at http://www.diabetes.org/)   -- I'm taking good care of my feet. Consider seeing the Podiatrist   -- Check blood sugars as directed, record in log book and bring to every appointment   -- Next diabetes lab draw: 3 months   -- Next diabetes office visit: 1 months        Your BMI is Body mass index is 29.56 kg/(m^2).  (BMI ranges: Normal 18.5 - 25, Overweight 25 - 30, Obesity greater than 30,     Morbid Obesity greater than 40 or greater than 35 with associated  conditions.)    Facts about losing weight:   -- Overweight and Obesity increase your risk for developing diabetes, high blood pressure and stroke, and shorten your life.   -- 90% of weight loss comes from dietary changes, only 10% from exercise    What should I do?   -- Work on 5-10% weight loss   -- Focus on a few healthy dietary changes   -- Eat more fresh fruits and vegetables, and fewer carbohydrates   -- Cut out all calorie-containing beverages (milk, juice, alcohol, etc)   -- Exercise every day   -- Weigh yourself once a week   -- Consider the DASH Diet (http://http://"[employer].Proclivity Systems/DASHDiet - redirects to the [facility])   -- Consider Weight Watchers (http://www.weightwatchers.com)   -- Consider My Fitness Pal (iOS, Android, http://www.[employer].SugarSync)                Follow-ups after your visit        Follow-up notes from your care team     Return in about 1 month (around 7/4/2018) for hypertension.      Your next 10 appointments already scheduled     Aug 28, 2018  8:40 AM CDT   SHORT with All Kunz MD   [facility] ([facility])    [address]  [place] [address] [phone]              Who to contact     If you have questions or need follow up information about today's clinic visit or your schedule please contact [facility] directly at [phone].  Normal or non-critical lab and imaging results will be communicated to you by MyChart, letter or phone within 4 business days after the clinic has received the results. If you do not hear from us within 7 days, please contact the clinic through [employer] or phone. If you have a critical or abnormal lab result, we will notify you by phone as soon as possible.  Submit refill requests through [employer] or call your pharmacy and they will forward the refill request to us. Please allow 3 business days for your refill to be completed.          Additional Information About Your Visit        Care  "EveryWhere ID     This is your Care EveryWhere ID. This could be used by other organizations to access your [place] medical records  [id number]        Your Vitals Were     Pulse Temperature Height BMI (Body Mass Index)          82 98.2  F (36.8  C) (Tympanic) 5' 10\" (1.778 m) 29.56 kg/m2         Blood Pressure from Last 3 Encounters:   06/04/18 (!) 168/98   02/05/18 138/82   09/07/17 138/88    Weight from Last 3 Encounters:   06/04/18 206 lb (93.4 kg)   02/05/18 216 lb 2 oz (98 kg)   09/07/17 198 lb 6 oz (90 kg)              We Performed the Following     CBC with platelets differential     Comprehensive metabolic panel     Hemoglobin A1c     Lipid Profile     Thyrotropin GH     Vitamin B12     Vitamin D Total GH          Today's Medication Changes          These changes are accurate as of 6/4/18  1:55 PM.  If you have any questions, ask your nurse or doctor.               These medicines have changed or have updated prescriptions.        Dose/Directions    metFORMIN 500 MG tablet   Commonly known as:  GLUCOPHAGE   This may have changed:  how much to take   Used for:  Controlled type 2 diabetes mellitus without complication, without long-term current use of insulin (H)   Changed by:  Radhames Morris MD        Dose:  1000 mg   Take 2 tablets (1,000 mg) by mouth 2 times daily (with meals) - adjust dose based on glucose levels   Quantity:  360 tablet   Refills:  3            Where to get your medicines      These medications were sent to [facility], - [place], [place] - [address]  [address] [place] [address]     Phone:  [phone]     metFORMIN 500 MG tablet    omeprazole 20 MG CR capsule    pravastatin 40 MG tablet                Primary Care Provider Office Phone # Fax #    All Kunz [phone]       [address] [facility] [address]        Equal Access to Services     CHAPARRO LOYD AH: heath Ravi " kirstenethel cee galvan. So [facility] [phone].    ATENCIÓN: Si jersey gutierrez, tiene a woods disposición servicios gratuitos de asistencia lingüística. Junior al [phone].    We comply with applicable federal civil rights laws and [place] laws. We do not discriminate on the basis of race, color, national origin, age, disability, sex, sexual orientation, or gender identity.            Thank you!     Thank you for choosing [facility]  for your care. Our goal is always to provide you with excellent care. Hearing back from our patients is one way we can continue to improve our services. Please take a few minutes to complete the written survey that you may receive in the mail after your visit with us. Thank you!             Your Updated Medication List - Protect others around you: Learn how to safely use, store and throw away your medicines at www.disposemymeds.org.          This list is accurate as of 6/4/18  1:55 PM.  Always use your most recent med list.                   Brand Name Dispense Instructions for use Diagnosis    aspirin 81 MG EC tablet      Take 1 tablet by mouth daily with food -- just a couple times weekly --        cyclobenzaprine 10 MG tablet    FLEXERIL     Take 1 tablet by mouth 3 times daily as needed for muscle spasms        lancets 28G Misc      Dispense item covered by pt ins. 250.02 NIDDM type II, uncontrolled - Test 4 times/day. Reason: High A1C and NEW Diabetes        metFORMIN 500 MG tablet    GLUCOPHAGE    360 tablet    Take 2 tablets (1,000 mg) by mouth 2 times daily (with meals) - adjust dose based on glucose levels    Controlled type 2 diabetes mellitus without complication, without long-term current use of insulin (H)       omeprazole 20 MG CR capsule    priLOSEC    90 capsule    Take 1 capsule (20 mg) by mouth daily Before a meal    Gastroesophageal reflux disease, esophagitis presence not specified        pravastatin 40 MG tablet    PRAVACHOL    90 tablet    Take 1 tablet (40 mg) by mouth At Bedtime . For Cholesterol and Heart Attack Risk Reduction -- check on cost/coverage vs other statin    Dyslipidemia       RA VITAMIN B-12 TR 1000 MCG Tbcr   Generic drug:  cyanocobalamin      Take 1 tablet by mouth daily -- For low vitamin B 12        SURECHEBillogram BLOOD GLUCOSE MONITOR w/Device Kit      Dispense item covered by pt ins. 250.02 NIDDM type II, uncontrolled - Test 4 times/day. Reason: High A1C and NEW Diabetes

## 2018-06-04 NOTE — LETTER
June 4, 2018      Sanjay Hoskins  [address] [facility] [address]        Dear ,    We are writing to inform you of your test results.    Your vitamin D level is too low.  I'd like you to take vitamin D3 2000 I U daily (over the counter).  We can recheck the level in 6-12 months.    Other than that blood work looks great.    Resulted Orders   Hemoglobin A1c   Result Value Ref Range    Hemoglobin A1C 6.7 (H) 4.0 - 6.0 %   Comprehensive metabolic panel   Result Value Ref Range    Sodium 140 134 - 144 mmol/L    Potassium 3.8 3.5 - 5.1 mmol/L    Chloride 104 98 - 107 mmol/L    Carbon Dioxide 27 21 - 31 mmol/L    Anion Gap 9 3 - 14 mmol/L    Glucose 122 (H) 70 - 105 mg/dL    Urea Nitrogen 9 7 - 25 mg/dL    Creatinine 0.84 0.70 - 1.30 mg/dL    GFR Estimate >90 >60 mL/min/1.7m2    GFR Estimate If Black >90 >60 mL/min/1.7m2    Calcium 9.2 8.6 - 10.3 mg/dL    Bilirubin Total 0.9 0.3 - 1.0 mg/dL    Albumin 4.2 3.5 - 5.7 g/dL    Protein Total 6.8 6.4 - 8.9 g/dL    Alkaline Phosphatase 97 34 - 104 U/L    ALT 54 (H) 7 - 52 U/L    AST 31 13 - 39 U/L   CBC with platelets differential   Result Value Ref Range    WBC 6.9 4.0 - 11.0 10e9/L    RBC Count 5.19 4.4 - 5.9 10e12/L    Hemoglobin 14.6 13.3 - 17.7 g/dL    Hematocrit 41.2 40.0 - 53.0 %    MCV 79 78 - 100 fl    MCH 28.1 26.5 - 33.0 pg    MCHC 35.4 31.5 - 36.5 g/dL    RDW 12.4 10.0 - 15.0 %    Platelet Count 204 150 - 450 10e9/L    Diff Method Automated Method     % Neutrophils 55.7 %    % Lymphocytes 34.0 %    % Monocytes 4.6 %    % Eosinophils 4.2 %    % Basophils 0.9 %    % Immature Granulocytes 0.6 %    Absolute Neutrophil 3.9 1.6 - 8.3 10e9/L    Absolute Lymphocytes 2.4 0.8 - 5.3 10e9/L    Absolute Monocytes 0.3 0.0 - 1.3 10e9/L    Absolute Eosinophils 0.3 0.0 - 0.7 10e9/L    Absolute Basophils 0.1 0.0 - 0.2 10e9/L    Abs Immature Granulocytes 0.0 0 - 0.4 10e9/L   Thyrotropin GH   Result Value Ref Range    Thyrotropin 1.86 0.34 - 5.60 IU/mL   Vitamin D Total  GH   Result Value Ref Range    Vitamin D Total 17.1 ng/mL      Comment:      Comments:  Deficiency:             <10 ng/mL  Insufficiency:        10-29 ng/mL  Sufficiency:          ng/mL  Possible Toxicity:     >100 ng/mL     Lipid Profile   Result Value Ref Range    Cholesterol 174 <200 mg/dL    Triglycerides 174 (H) <150 mg/dL      Comment:      Borderline high:  150-199 mg/dl  High:             200-499 mg/dl  Very high:       >499 mg/dl      HDL Cholesterol 47 23 - 92 mg/dL    LDL Cholesterol Calculated 92 <100 mg/dL      Comment:      Desirable:       <100 mg/dl    Non HDL Cholesterol 127 <130 mg/dL   Vitamin B12   Result Value Ref Range    Vitamin B12 662 180 - 914 pg/mL       If you have any questions or concerns, please call the clinic at the number listed above.       Sincerely,        Radhames Morris MD

## 2018-06-04 NOTE — NURSING NOTE
Previous A1C is at goal of <8  No results found for: A1C  Urine microalbumin:creatine: 10/21/15  Foot exam DUE  Eye exam does not rememeber  Tobacco User NO-former smoker  Patient is on a daily aspirin  Patient is on a Statin.  Blood pressure today of:     BP Readings from Last 1 Encounters:   02/05/18 138/82      is not at the goal of <139/89 for diabetics.    Grace Ashton LPN on 6/4/2018 at 1:21 PM

## 2018-06-04 NOTE — PROGRESS NOTES
"Subjective  Sanjay Hoskins is a 32 year old male who presents for diabetes follow-up.  He has been having pain in the  hands and feet.  It seems to come and go.  It jumps around in different locations on the hands and feet.  It feels like a burning sensation.  Occasionally it is a sharp sensation in the hands.  He can feel tingling.  He typically sees Dr. Kunz who recommended he take vitamin B12 which was helping but now is no longer helping.  History of diabetes mellitus type 2 which has been well-controlled with metformin.  He continues on aspirin prophylaxis, pravastatin prophylaxis.  He did not realize his blood pressure was elevated.    Allergies: reviewed in EMR  Medications: reviewed in EMR  Problem List/PMH:reviewed in EMR    Social Hx:  Social History   Substance Use Topics     Smoking status: Former Smoker     Packs/day: 0.25     Years: 6.00     Quit date: 4/4/2018     Smokeless tobacco: Never Used     Alcohol use 3.6 oz/week      Comment: Alcoholic Drinks/day: About once a week, beer     Social History     Social History Narrative    Lives independently.   Worked at [employer] in [place]  -- working at [employer] in [place].     I reviewed social history and made relevant updates today.    Family Hx:   Family History   Problem Relation Age of Onset     DIABETES Mother      Diabetes     Family History Negative Father      Good Health,COPD     Arthritis Father      Arthritis,RA?       Objective  Vitals: reviewed in EMR.  /90 (BP Location: Right arm, Patient Position: Sitting, Cuff Size: Adult Large)  Pulse 82  Temp 98.2  F (36.8  C) (Tympanic)  Ht 5' 10\" (1.778 m)  Wt 206 lb (93.4 kg)  BMI 29.56 kg/m2    Gen: Pleasant male, NAD.  HEENT: MMM  Neck: Supple  Pulm: Breathing easily  Neuro: Grossly intact  Skin: No concerning lesions.  Psychiatric: Normal affect and insight. Does not appear anxious or depressed.    Foot Exam:  6/4/2018  Intact to monofilament bilaterally.  Skin intact " without erythema.    Diabetes Labs  Hemoglobin A1C (%)   Date Value   06/04/2018 6.7 (H)     Creatinine (mg/dL)   Date Value   06/04/2018 0.84   10/20/2015 0.66 (L)   09/02/2015 1.01     Glucose (mg/dL)   Date Value   06/04/2018 122 (H)   10/20/2015 78   09/02/2015 310 (H)     Potassium (mmol/L)   Date Value   06/04/2018 3.8   10/20/2015 4.0   09/02/2015 3.9       Assessment    ICD-10-CM    1. Controlled type 2 diabetes mellitus without complication, without long-term current use of insulin (H) E11.9 metFORMIN (GLUCOPHAGE) 500 MG tablet     Hemoglobin A1c     Comprehensive metabolic panel     CBC with platelets differential     Thyrotropin GH     Vitamin D Total GH     Lipid Profile     Vitamin B12     lisinopril (PRINIVIL/ZESTRIL) 5 MG tablet   2. History of tobacco use Z87.891    3. Essential hypertension I10 lisinopril (PRINIVIL/ZESTRIL) 5 MG tablet   4. Dyslipidemia E78.5 pravastatin (PRAVACHOL) 40 MG tablet   5. Gastroesophageal reflux disease, esophagitis presence not specified K21.9 omeprazole (PRILOSEC) 20 MG CR capsule   6. Tingling in extremities R20.2 Hemoglobin A1c     Comprehensive metabolic panel     CBC with platelets differential     Thyrotropin GH     Vitamin D Total GH     Lipid Profile     Vitamin B12   7. Overweight E66.3      Symptoms he describes are not consistent with diabetic neuropathy because of the waxing and waning episodes, moving around the hands and feet as I would expect neuropathy to be a more progressive illness.  His blood sugars have also been well-controlled in the last year which argues against progressive neuropathy.  Recommend blood work to look for other etiologies and if symptoms persist with out explanation would consider EMG.    Plan  Patient Instructions     .Aspects of Diabetes we can improve:  Hemoglobin A1c No results found for: A1C Goal range is under 8. Best is 6.5 to 7   Blood Pressure 168/98 Goal to keep less than 140/90   Tobacco  reports that he quit smoking  about 2 months ago. He has a 1.50 pack-year smoking history. He has never used smokeless tobacco. Goal to abstain from tobacco   Aspirin yes Aspirin reduces risk of heart disease and stroke   ACE/ARB Start lisinopril These medications reduce risk of kidney disease   Cholesterol Pravastatin Statins reduce risk of heart disease and stroke   Eye Exam annually Annual diabetic eye exam   Healthy weight Body mass index is 29.56 kg/(m^2). Goal BMI under 30, best is under 25.      -- I'm trying to exercise daily (goal at least 20 min/day) with moderate aerobic activity   -- Eat healthy (resources from ADA at http://www.diabetes.org/)   -- I'm taking good care of my feet. Consider seeing the Podiatrist   -- Check blood sugars as directed, record in log book and bring to every appointment   -- Next diabetes lab draw: 3 months   -- Next diabetes office visit: 1 months        Your BMI is Body mass index is 29.56 kg/(m^2).  (BMI ranges: Normal 18.5 - 25, Overweight 25 - 30, Obesity greater than 30,     Morbid Obesity greater than 40 or greater than 35 with associated conditions.)    Facts about losing weight:   -- Overweight and Obesity increase your risk for developing diabetes, high blood pressure and stroke, and shorten your life.   -- 90% of weight loss comes from dietary changes, only 10% from exercise    What should I do?   -- Work on 5-10% weight loss   -- Focus on a few healthy dietary changes   -- Eat more fresh fruits and vegetables, and fewer carbohydrates   -- Cut out all calorie-containing beverages (milk, juice, alcohol, etc)   -- Exercise every day   -- Weigh yourself once a week   -- Consider the DASH Diet (http://http://bit.ly/DASHDiet - redirects to the NIH)   -- Consider Weight Watchers (http://www.weightwatchers.com)   -- Consider My Fitness Pal (iOS, Android, http://www.[email or web address])            Return in about 1 month (around 7/4/2018) for hypertension.    Signed, Radhames Morris MD  Internal Medicine &  Pediatrics

## 2018-06-05 ASSESSMENT — PATIENT HEALTH QUESTIONNAIRE - PHQ9: SUM OF ALL RESPONSES TO PHQ QUESTIONS 1-9: 0

## 2018-06-05 ASSESSMENT — ANXIETY QUESTIONNAIRES: GAD7 TOTAL SCORE: 0

## 2018-07-24 NOTE — PROGRESS NOTES
Patient Information     Patient Name  Sanjay Hoskins MRN  [id number] Sex  Male   1985      Letter by All Kunz MD at      Author:  All Kunz MD Service:  (none) Author Type:  (none)    Filed:   Encounter Date:  2017 Status:  (Other)           Sanjay Hoskins  [address] [facility] [address]          2017    Dear Mr. Hoskins:    Following are the tests completed during your last clinic visit.  The results of these tests are included below.      Your hemoglobin A1c is now normal.  Continue metformin for diabetes prevention.    Consider starting some vitamin B12 tablets daily -- or as little as 2 or 3 times a week to help with neuropathy / pins and needles sensation.    Results for orders placed or performed in visit on 17      HEMOGLOBIN A1C MONITORING (POCT)      Result  Value Ref Range    HEMOGLOBIN A1C MONITORING (POCT) 5.6 4.0 - 6.2 %    ESTIMATED AVERAGE GLUCOSE  114 mg/dL         If you have any further questions or problems contact my office at  [phone]   --- or send [employer] message --- otherwise schedule an appointment.    Clinic : [phone]  Appointment line: [phone]     Thank you,    All Kunz MD    Internal Medicine  [facility]     Reviewed and electronically signed by provider.

## 2018-08-18 ENCOUNTER — HOSPITAL ENCOUNTER (EMERGENCY)
Facility: OTHER | Age: 33
Discharge: HOME | End: 2018-08-18
Attending: FAMILY MEDICINE | Admitting: FAMILY MEDICINE

## 2018-08-18 VITALS
TEMPERATURE: 98 F | HEIGHT: 70 IN | SYSTOLIC BLOOD PRESSURE: 163 MMHG | OXYGEN SATURATION: 98 % | DIASTOLIC BLOOD PRESSURE: 104 MMHG | BODY MASS INDEX: 29.35 KG/M2 | WEIGHT: 205 LBS | RESPIRATION RATE: 16 BRPM | HEART RATE: 73 BPM

## 2018-08-18 DIAGNOSIS — K08.89 PAIN, DENTAL: ICD-10-CM

## 2018-08-18 PROCEDURE — 25000132 ZZH RX MED GY IP 250 OP 250 PS 637: Performed by: FAMILY MEDICINE

## 2018-08-18 PROCEDURE — 99283 EMERGENCY DEPT VISIT LOW MDM: CPT | Mod: Z6 | Performed by: FAMILY MEDICINE

## 2018-08-18 PROCEDURE — 99283 EMERGENCY DEPT VISIT LOW MDM: CPT | Performed by: FAMILY MEDICINE

## 2018-08-18 RX ORDER — CLINDAMYCIN HCL 300 MG
300 CAPSULE ORAL 3 TIMES DAILY
Qty: 15 CAPSULE | Refills: 0 | Status: SHIPPED | OUTPATIENT
Start: 2018-08-18 | End: 2018-08-23

## 2018-08-18 RX ORDER — CLINDAMYCIN HCL 150 MG
300 CAPSULE ORAL ONCE
Status: COMPLETED | OUTPATIENT
Start: 2018-08-18 | End: 2018-08-18

## 2018-08-18 RX ORDER — OXYCODONE HYDROCHLORIDE 5 MG/1
5 TABLET ORAL EVERY 6 HOURS PRN
Qty: 12 TABLET | Refills: 0 | Status: SHIPPED | OUTPATIENT
Start: 2018-08-18 | End: 2020-06-05

## 2018-08-18 RX ADMIN — CLINDAMYCIN HYDROCHLORIDE 300 MG: 150 CAPSULE ORAL at 20:54

## 2018-08-18 NOTE — ED AVS SNAPSHOT
[facility]    [address] [address]    Phone:  [phone]    Fax:  [phone]                                       Sanjay Hoskins   MRN: [id number]    Department:  [facility]   Date of Visit:  8/18/2018           Patient Information     Date Of Birth          1985        Your diagnoses for this visit were:     Pain, dental        You were seen by Luis Fernando Gatica MD.      Follow-up Information     Follow up with [facility].    Specialty:  EMERGENCY MEDICINE    Why:  As needed, If symptoms worsen however a dentist appointment on Monday would be highly advised    Contact information:    [address] [place] [id number] [phone]      Your next 10 appointments already scheduled     Aug 28, 2018  8:40 AM CDT   SHORT with All Kunz MD   [facility] ([facility])    [address] [place] [id number] [phone]              24 Hour Appointment Hotline     To schedule an appointment at [facility], please call [phone]. If you don't have a family doctor or clinic, we will help you find one. [facility] clinics are conveniently located to serve the needs of you and your family.           Review of your medicines      START taking        Dose / Directions Last dose taken    clindamycin 300 MG capsule   Commonly known as:  CLEOCIN   Dose:  300 mg   Quantity:  15 capsule        Take 1 capsule (300 mg) by mouth 3 times daily for 5 days   Refills:  0        oxyCODONE IR 5 MG tablet   Commonly known as:  ROXICODONE   Dose:  5 mg   Quantity:  12 tablet        Take 1 tablet (5 mg) by mouth every 6 hours as needed for pain   Refills:  0          Our records show that you are taking the medicines listed below. If these are incorrect, please call your family doctor or clinic.        Dose / Directions Last dose taken    ADVIL PO   Dose:  800  mg        Take 800 mg by mouth   Refills:  0        aspirin 81 MG EC tablet   Dose:  1 tablet        Take 1 tablet by mouth daily with food -- just a couple times weekly --   Refills:  0        cyclobenzaprine 10 MG tablet   Commonly known as:  FLEXERIL   Dose:  1 tablet        Take 1 tablet by mouth 3 times daily as needed for muscle spasms   Refills:  0        lancets 28G Misc        Dispense item covered by pt ins. 250.02 NIDDM type II, uncontrolled - Test 4 times/day. Reason: High A1C and NEW Diabetes   Refills:  0        lisinopril 5 MG tablet   Commonly known as:  PRINIVIL/ZESTRIL   Dose:  5 mg   Quantity:  90 tablet        Take 1 tablet (5 mg) by mouth daily   Refills:  3        metFORMIN 500 MG tablet   Commonly known as:  GLUCOPHAGE   Dose:  1000 mg   Quantity:  360 tablet        Take 2 tablets (1,000 mg) by mouth 2 times daily (with meals) - adjust dose based on glucose levels   Refills:  3        omeprazole 20 MG CR capsule   Commonly known as:  priLOSEC   Dose:  20 mg   Quantity:  90 capsule        Take 1 capsule (20 mg) by mouth daily Before a meal   Refills:  4        pravastatin 40 MG tablet   Commonly known as:  PRAVACHOL   Dose:  40 mg   Quantity:  90 tablet        Take 1 tablet (40 mg) by mouth At Bedtime . For Cholesterol and Heart Attack Risk Reduction -- check on cost/coverage vs other statin   Refills:  4        RA VITAMIN B-12 TR 1000 MCG Tbcr   Dose:  1 tablet   Generic drug:  cyanocobalamin        Take 1 tablet by mouth daily -- For low vitamin B 12   Refills:  0        [employer] BiometricsCHEK BLOOD GLUCOSE MONITOR w/Device Kit        Dispense item covered by pt ins. 250.02 NIDDM type II, uncontrolled - Test 4 times/day. Reason: High A1C and NEW Diabetes   Refills:  0                Information about OPIOIDS     PRESCRIPTION OPIOIDS: WHAT YOU NEED TO KNOW   We gave you an opioid (narcotic) pain medicine. It is important to manage your pain, but opioids are not always the best choice. You should first try  all the other options your care team gave you. Take this medicine for as short a time (and as few doses) as possible.    Some activities can increase your pain, such as bandage changes or therapy sessions. It may help to take your pain medicine 30 to 60 minutes before these activities. Reduce your stress by getting enough sleep, working on hobbies you enjoy and practicing relaxation or meditation. Talk to your care team about ways to manage your pain beyond prescription opioids.    These medicines have risks:    DO NOT drive when on new or higher doses of pain medicine. These medicines can affect your alertness and reaction times, and you could be arrested for driving under the influence (DUI). If you need to use opioids long-term, talk to your care team about driving.    DO NOT operate heavy machinery    DO NOT do any other dangerous activities while taking these medicines.    DO NOT drink any alcohol while taking these medicines.     If the opioid prescribed includes acetaminophen, DO NOT take with any other medicines that contain acetaminophen. Read all labels carefully. Look for the word  acetaminophen  or  Tylenol.  Ask your pharmacist if you have questions or are unsure.    You can get addicted to pain medicines, especially if you have a history of addiction (chemical, alcohol or substance dependence). Talk to your care team about ways to reduce this risk.    All opioids tend to cause constipation. Drink plenty of water and eat foods that have a lot of fiber, such as fruits, vegetables, prune juice, apple juice and high-fiber cereal. Take a laxative (Miralax, milk of magnesia, Colace, Senna) if you don t move your bowels at least every other day. Other side effects include upset stomach, sleepiness, dizziness, throwing up, tolerance (needing more of the medicine to have the same effect), physical dependence and slowed breathing.    Store your pills in a secure place, locked if possible. We will not replace any  lost or stolen medicine. If you don t finish your medicine, please throw away (dispose) as directed by your pharmacist. The [place] [employer]ution [employer] has more information about safe disposal: https://www.pca.state.[place]/living-green/managing-unwanted-medications        Prescriptions were sent or printed at these locations (2 Prescriptions)                   [facility], - [place], [place] - [address]   [address], [place] [address]    Telephone:  [phone]   Fax:  [phone]   Hours:                  Printed at Department/Unit printer (2 of 2)         clindamycin (CLEOCIN) 300 MG capsule               oxyCODONE IR (ROXICODONE) 5 MG tablet                Orders Needing Specimen Collection     None      Pending Results     No orders found from 8/16/2018 to 8/19/2018.            Pending Culture Results     No orders found from 8/16/2018 to 8/19/2018.            Pending Results Instructions     If you had any lab results that were not finalized at the time of your Discharge, you can call the ED Lab Result RN at [phone]. You will be contacted by this team for any positive Lab results or changes in treatment. The nurses are available 7 days a week from 10A to 6:30P.  You can leave a message 24 hours per day and they will return your call.        Thank you for choosing [facility]       Thank you for choosing [facility] for your care. Our goal is always to provide you with excellent care. Hearing back from our patients is one way we can continue to improve our services. Please take a few minutes to complete the written survey that you may receive in the mail after you visit with us. Thank you!        Care EveryWhere ID     This is your Care EveryWhere ID. This could be used by other organizations to access your [facility] medical records  [id number]        Equal Access to Services     CHAPARRO LOYD AH: heath Ravi qaybta kaalmada  cee redman ah. So [facility] [phone].    ATENCIÓN: Si habla español, tiene a woods disposición servicios gratuitos de asistencia lingüística. Llame al [phone].    We comply with applicable federal civil rights laws and [place] laws. We do not discriminate on the basis of race, color, national origin, age, disability, sex, sexual orientation, or gender identity.            After Visit Summary       This is your record. Keep this with you and show to your community pharmacist(s) and doctor(s) at your next visit.

## 2018-08-18 NOTE — ED AVS SNAPSHOT
[facility]    [address]    [place] [address]    Phone:  [phone]    Fax:  [phone]                                       Sanjay Hoskins   MRN: [id number]    Department:  [facility]   Date of Visit:  8/18/2018           After Visit Summary Signature Page     I have received my discharge instructions, and my questions have been answered. I have discussed any challenges I see with this plan with the nurse or doctor.    ..........................................................................................................................................  Patient/Patient Representative Signature      ..........................................................................................................................................  Patient Representative Print Name and Relationship to Patient    ..................................................               ................................................  Date                                            Time    ..........................................................................................................................................  Reviewed by Signature/Title    ...................................................              ..............................................  Date                                                            Time

## 2018-08-19 NOTE — ED TRIAGE NOTES
Patient states that he started having tooth pain in the right lower molars. States the swelling and pain became intolerable so he came into the ER. Rates pain 9/10.

## 2018-08-19 NOTE — ED PROVIDER NOTES
History   No chief complaint on file.    HPI  Sanjay Hoskins is a 32 year old male who has developed dental pain over the last 2 days and particularly worse today.  He thinks he has had a root canal procedure done on this tooth in the past.  It is a right sided molar that it appears to be the painful tooth for him with adjacent significant swelling that is apparent also.  Patient is a type II diabetic should also be noted.  He has not had a fever chills or other complaints.              Problem List:    Patient Active Problem List    Diagnosis Date Noted     Controlled type 2 diabetes mellitus without complication, without long-term current use of insulin (H) 06/04/2018     Priority: Medium     History of tobacco use 06/04/2018     Priority: Medium     Gastroesophageal reflux disease, esophagitis presence not specified 06/04/2018     Priority: Medium     Overweight 06/04/2018     Priority: Medium     History of neck pain 08/24/2016     Priority: Medium     Dyslipidemia 09/22/2015     Priority: Medium     Heartburn 09/22/2015     Priority: Medium        Past Medical History:    Past Medical History:   Diagnosis Date     Hyperlipidemia      Personal history of nicotine dependence        Past Surgical History:    No past surgical history on file.    Family History:    Family History   Problem Relation Age of Onset     Diabetes Mother      Diabetes     Family History Negative Father      Good Health,COPD     Arthritis Father      Arthritis,RA?       Social History:  Marital Status:  Single [1]  Social History   Substance Use Topics     Smoking status: Former Smoker     Packs/day: 0.25     Years: 6.00     Quit date: 4/4/2018     Smokeless tobacco: Never Used     Alcohol use 3.6 oz/week      Comment: Alcoholic Drinks/day: About once a week, beer        Medications:      clindamycin (CLEOCIN) 300 MG capsule   Ibuprofen (ADVIL PO)   metFORMIN (GLUCOPHAGE) 500 MG tablet   omeprazole (PRILOSEC) 20 MG CR capsule   oxyCODONE  "IR (ROXICODONE) 5 MG tablet   aspirin EC 81 MG EC tablet   Blood Glucose Monitoring Suppl (SURECHEK BLOOD GLUCOSE MONITOR) W/DEVICE KIT   cyanocobalamin (RA VITAMIN B-12 TR) 1000 MCG TBCR   cyclobenzaprine (FLEXERIL) 10 MG tablet   lancets 28G MISC   lisinopril (PRINIVIL/ZESTRIL) 5 MG tablet   pravastatin (PRAVACHOL) 40 MG tablet         Review of Systems unremarkable on full review other than what has already been noted in the HPI, no GI or  symptoms, and no acute cardiopulmonary symptoms.    Physical Exam   BP: (!) 167/112  Pulse: 73  Temp: 98  F (36.7  C)  Resp: 16  Height: 177.8 cm (5' 10\")  Weight: 93 kg (205 lb)  SpO2: 98 %      Physical Exam alert pleasant but uncomfortable man who is although appearing uncomfortable is certainly not in any acute distress but is mildly hypertensive with his dental pain.  HEENT: Tenderness in the right lower teeth with none of them appearing to be carious but having had what appears to be feelings in the first molar appears to be most tender to palpation.  Adjacent swelling of the right side of his cheek and sub-mandibular area is quite evident with marked tenderness in palpating these areas.  The remainder of the ENT exam is otherwise unremarkable  Cardiopulmonary unremarkable findings    ED Course     ED Course     Procedures               Critical Care time:  none               No results found for this or any previous visit (from the past 24 hour(s)).    Medications   clindamycin (CLEOCIN) capsule 300 mg (300 mg Oral Given 8/18/18 2054)       Assessments & Plan (with Medical Decision Making)     I have reviewed the nursing notes.    I have reviewed the findings, diagnosis, plan and need for follow up with the patient.   This patient clearly has a major dental problem in early evolution with the significant amount of pain and probable abscess development at the base of this molar that seems to be the most painful one for him.  He understands importance of having follow-up " with a dentist in the very immediate future, however until then he needs to be on some antibiotics will be placed on clindamycin and oral pain medication limited about follow-up with his primary care provider or dentist by Monday.    New Prescriptions    CLINDAMYCIN (CLEOCIN) 300 MG CAPSULE    Take 1 capsule (300 mg) by mouth 3 times daily for 5 days    OXYCODONE IR (ROXICODONE) 5 MG TABLET    Take 1 tablet (5 mg) by mouth every 6 hours as needed for pain       Final diagnoses:   Pain, dental       8/18/2018   [facility]     Luis Fernando Gatica MD  08/18/18 [id number]

## 2018-10-02 DIAGNOSIS — Z87.39 HISTORY OF NECK PAIN: Primary | ICD-10-CM

## 2018-10-05 RX ORDER — CYCLOBENZAPRINE HCL 10 MG
TABLET ORAL
Qty: 90 TABLET | Refills: 1 | Status: SHIPPED | OUTPATIENT
Start: 2018-10-05 | End: 2019-10-14

## 2018-10-05 NOTE — TELEPHONE ENCOUNTER
Flexeril      Last Written Prescription Date:  9/8/17  Last Fill Quantity: 90,   # refills: 3  Last Office Visit: 8/28/18  Future Office visit:   None noted    Routing refill request to provider for review/approval because:  Drug not on the [facility] [place] [facility], P or [facility] refill protocol or controlled substance  cyclobenzaprine (FLEXERIL) 10 mg tablet    Indications: Cervical strain, acute, initial encounter Take 1 tablet by mouth 3 times daily if needed for Muscle Spasm. 90 tablet   3 09/08/2017       Estela Almaraz RN on 10/5/2018 at 11:34 AM

## 2019-07-01 DIAGNOSIS — E11.9 CONTROLLED TYPE 2 DIABETES MELLITUS WITHOUT COMPLICATION, WITHOUT LONG-TERM CURRENT USE OF INSULIN (H): ICD-10-CM

## 2019-07-01 DIAGNOSIS — I10 ESSENTIAL HYPERTENSION: ICD-10-CM

## 2019-07-03 RX ORDER — LISINOPRIL 5 MG/1
5 TABLET ORAL DAILY
Qty: 90 TABLET | Refills: 3 | Status: SHIPPED | OUTPATIENT
Start: 2019-07-03 | End: 2020-06-05

## 2019-07-03 NOTE — TELEPHONE ENCOUNTER
Routing refill request to provider for review/approval because:   Blood pressure less than 140/90 in past 6 months    Patient has documented LDL within the past 12 mos.    Patient has had a Microalbumin in the past 15 mos.    Patient has documented A1c within the specified period of time.    Patient's CR is NOT>1.4 OR Patient's EGFR is NOT<45 within past 12 mos.    Recent (6 mo) or future (30 days) visit within the authorizing provider's specialty     LOV: 8/28/18  Last labs: 6/4/18  Estela Almaraz RN on 7/3/2019 at 4:23 PM

## 2019-10-07 ENCOUNTER — OFFICE VISIT (OUTPATIENT)
Dept: FAMILY MEDICINE | Facility: OTHER | Age: 34
End: 2019-10-07
Attending: FAMILY MEDICINE

## 2019-10-07 VITALS
TEMPERATURE: 97.8 F | HEART RATE: 92 BPM | BODY MASS INDEX: 30.15 KG/M2 | HEIGHT: 70 IN | OXYGEN SATURATION: 98 % | DIASTOLIC BLOOD PRESSURE: 88 MMHG | WEIGHT: 210.6 LBS | RESPIRATION RATE: 16 BRPM | SYSTOLIC BLOOD PRESSURE: 136 MMHG

## 2019-10-07 DIAGNOSIS — M25.571 PAIN IN JOINT INVOLVING ANKLE AND FOOT, RIGHT: Primary | ICD-10-CM

## 2019-10-07 PROCEDURE — 99213 OFFICE O/P EST LOW 20 MIN: CPT | Performed by: FAMILY MEDICINE

## 2019-10-07 RX ORDER — NAPROXEN 500 MG/1
500 TABLET ORAL 2 TIMES DAILY WITH MEALS
Qty: 60 TABLET | Refills: 3 | Status: SHIPPED | OUTPATIENT
Start: 2019-10-07 | End: 2021-04-13

## 2019-10-07 ASSESSMENT — MIFFLIN-ST. JEOR: SCORE: 1901.53

## 2019-10-07 ASSESSMENT — PAIN SCALES - GENERAL: PAINLEVEL: SEVERE PAIN (6)

## 2019-10-07 NOTE — NURSING NOTE
Patient presents today for right foot pain. Patient does not recall hurting it, but it has been painful for the last 2 weeks.  Medication Reconciliation Complete    Kathy Keene LPN  10/7/2019 2:42 PM

## 2019-10-07 NOTE — PROGRESS NOTES
"SUBJECTIVE:  Sanjay Hoskins is a 34 year old male here for right ankle pain.  He reports he has had right ankle pain for last 2 weeks.  Does not recall any particular trauma or overuse activity that brought his pain on.  He describes his pain is been in his anterior ankle going into his arch recently.  He has tried some activity modification and occasional ice without any significant relief.  He has a history of type 2 diabetes and is worried that that may be causing some of his symptoms.    ROS:    As above otherwise ROS is unremarkable.    OBJECTIVE:  /88   Pulse 92   Temp 97.8  F (36.6  C)   Resp 16   Ht 1.778 m (5' 10\")   Wt 95.5 kg (210 lb 9.6 oz)   SpO2 98%   BMI 30.22 kg/m      EXAM:  General Appearance: Pleasant, alert, appropriate appearance for age. No acute distress  Musculoskeletal: Right ankle shows normal range of motion however he has pain with both dorsiflexion and plantarflexion.  No tenderness palpation along his medial or lateral malleolus, anterior midfoot, fifth metatarsal or posterior calcaneus.  Mild anterior swelling is noted.  Negative anterior drawer.  Neurologic Exam: No focal motor or sensory deficits.    ASSESSEMENT AND PLAN:    1. Pain in joint involving ankle and foot, right      Given his exam and symptoms I doubt that this is a bony abnormality.  He has pain along his anterior tibialis so we will start with naproxen twice daily with meals for the next 2 weeks, ice and activity as tolerated.  If he is having no significant improvement we could consider MRI.  His symptoms are consistent with neuropathy but given his history of type 2 diabetes we may need to consider that in the future.    Anand Chiang MD    This document was prepared using voice generated software.  While every attempt was made for accuracy, grammatical errors may exist.  "

## 2019-10-31 DIAGNOSIS — K21.9 GASTROESOPHAGEAL REFLUX DISEASE, ESOPHAGITIS PRESENCE NOT SPECIFIED: ICD-10-CM

## 2019-11-04 NOTE — TELEPHONE ENCOUNTER
Prescription refilled per RN Medication Refill Policy..................Gwendolyn Panchal RN 11/4/2019 4:19 PM

## 2020-02-06 DIAGNOSIS — K21.9 GASTROESOPHAGEAL REFLUX DISEASE, ESOPHAGITIS PRESENCE NOT SPECIFIED: ICD-10-CM

## 2020-02-06 NOTE — TELEPHONE ENCOUNTER
NISHANT sent Rx request for the following:      OMEPRAZOLE 20 MG CAPSULE   Sig: Take 1 capsule (20 mg) by mouth daily Before a meal      Last Prescription Date:   11/4/2019  Last Fill Qty/Refills:         90, R-0    Last Office Visit:              8/28/2018  Future Office visit:           none    Routing refill request to provider for review/approval because:  Patient is overdue for annual    Sent reminder letter to patient to schedule annual appt.     Unable to complete prescription refill per RN Medication Refill Policy.................... Christian Rosado RN ....................  2/6/2020   2:35 PM

## 2020-02-06 NOTE — LETTER
February 6, 2020      Sanjay Hoskins  [address] [facility] [address]        Dear Sanjay,     This letter is to remind you that you are due for your annual exam with All Kunz. Your last comprehensive visit was more than 12 months ago.    Your request for a refill of OMEPRAZOLE 20 MG CAPSULE DR has been sent to your provider for review. Additional refills require you to complete this appointment.    Please call the clinic at [phone] to schedule your appointment.    If you should require additional refills before your scheduled appointment, please contact your pharmacy and we will refill your medication until the date of your appointment.    If you are no longer seeing All Kunz for primary care, please call to let us know. Doing so will remove you from our call/contact list.      Thank you for choosing [facility] for your health care needs.    Sincerely,    Refill FREDDIE  [facility]

## 2020-06-05 ENCOUNTER — OFFICE VISIT (OUTPATIENT)
Dept: INTERNAL MEDICINE | Facility: OTHER | Age: 35
End: 2020-06-05
Attending: INTERNAL MEDICINE
Payer: COMMERCIAL

## 2020-06-05 VITALS
RESPIRATION RATE: 16 BRPM | HEART RATE: 76 BPM | TEMPERATURE: 98.5 F | WEIGHT: 212.5 LBS | BODY MASS INDEX: 30.42 KG/M2 | SYSTOLIC BLOOD PRESSURE: 136 MMHG | DIASTOLIC BLOOD PRESSURE: 80 MMHG | HEIGHT: 70 IN

## 2020-06-05 DIAGNOSIS — E78.2 MIXED HYPERLIPIDEMIA: ICD-10-CM

## 2020-06-05 DIAGNOSIS — I10 BENIGN ESSENTIAL HYPERTENSION: ICD-10-CM

## 2020-06-05 DIAGNOSIS — E11.40 PAINFUL DIABETIC NEUROPATHY (H): ICD-10-CM

## 2020-06-05 DIAGNOSIS — K21.9 GASTROESOPHAGEAL REFLUX DISEASE, ESOPHAGITIS PRESENCE NOT SPECIFIED: ICD-10-CM

## 2020-06-05 DIAGNOSIS — E11.42 CONTROLLED TYPE 2 DIABETES MELLITUS WITH DIABETIC POLYNEUROPATHY, WITHOUT LONG-TERM CURRENT USE OF INSULIN (H): Primary | ICD-10-CM

## 2020-06-05 DIAGNOSIS — L84 PRE-ULCERATIVE CALLUSES: ICD-10-CM

## 2020-06-05 LAB
ALBUMIN SERPL-MCNC: 4.7 G/DL (ref 3.5–5.7)
ALBUMIN UR-MCNC: 10 MG/DL
ALP SERPL-CCNC: 64 U/L (ref 34–104)
ALT SERPL W P-5'-P-CCNC: 63 U/L (ref 7–52)
ANION GAP SERPL CALCULATED.3IONS-SCNC: 6 MMOL/L (ref 3–14)
APPEARANCE UR: CLEAR
AST SERPL W P-5'-P-CCNC: 26 U/L (ref 13–39)
BILIRUB SERPL-MCNC: 1 MG/DL (ref 0.3–1)
BILIRUB UR QL STRIP: NEGATIVE
BUN SERPL-MCNC: 17 MG/DL (ref 7–25)
CALCIUM SERPL-MCNC: 9.6 MG/DL (ref 8.6–10.3)
CHLORIDE SERPL-SCNC: 102 MMOL/L (ref 98–107)
CHOLEST SERPL-MCNC: 187 MG/DL
CO2 SERPL-SCNC: 30 MMOL/L (ref 21–31)
COLOR UR AUTO: ABNORMAL
CREAT SERPL-MCNC: 0.7 MG/DL (ref 0.7–1.3)
CREAT UR-MCNC: 100 MG/DL
ERYTHROCYTE [DISTWIDTH] IN BLOOD BY AUTOMATED COUNT: 12.4 % (ref 10–15)
GFR SERPL CREATININE-BSD FRML MDRD: >90 ML/MIN/{1.73_M2}
GLUCOSE SERPL-MCNC: 169 MG/DL (ref 70–105)
GLUCOSE UR STRIP-MCNC: >1000 MG/DL
HBA1C MFR BLD: 6.5 % (ref 4–6)
HCT VFR BLD AUTO: 45.2 % (ref 40–53)
HDLC SERPL-MCNC: 51 MG/DL (ref 23–92)
HGB BLD-MCNC: 15.2 G/DL (ref 13.3–17.7)
HGB UR QL STRIP: NEGATIVE
KETONES UR STRIP-MCNC: 10 MG/DL
LDLC SERPL CALC-MCNC: 108 MG/DL
LEUKOCYTE ESTERASE UR QL STRIP: NEGATIVE
MCH RBC QN AUTO: 27.7 PG (ref 26.5–33)
MCHC RBC AUTO-ENTMCNC: 33.6 G/DL (ref 31.5–36.5)
MCV RBC AUTO: 83 FL (ref 78–100)
MICROALBUMIN UR-MCNC: 18 MG/L
MICROALBUMIN/CREAT UR: 18.3 MG/G CR (ref 0–17)
NITRATE UR QL: NEGATIVE
NONHDLC SERPL-MCNC: 136 MG/DL
PH UR STRIP: 6.5 PH (ref 5–7)
PLATELET # BLD AUTO: 225 10E9/L (ref 150–450)
POTASSIUM SERPL-SCNC: 4.8 MMOL/L (ref 3.5–5.1)
PROT SERPL-MCNC: 7.7 G/DL (ref 6.4–8.9)
RBC # BLD AUTO: 5.48 10E12/L (ref 4.4–5.9)
RBC #/AREA URNS AUTO: <1 /HPF (ref 0–2)
SODIUM SERPL-SCNC: 138 MMOL/L (ref 134–144)
SOURCE: ABNORMAL
SP GR UR STRIP: 1.03 (ref 1–1.03)
TRIGL SERPL-MCNC: 140 MG/DL
TSH SERPL DL<=0.05 MIU/L-ACNC: 1.8 IU/ML (ref 0.34–5.6)
UROBILINOGEN UR STRIP-MCNC: NORMAL MG/DL (ref 0–2)
WBC # BLD AUTO: 7.3 10E9/L (ref 4–11)
WBC #/AREA URNS AUTO: <1 /HPF (ref 0–5)

## 2020-06-05 PROCEDURE — 80053 COMPREHEN METABOLIC PANEL: CPT | Mod: ZL | Performed by: INTERNAL MEDICINE

## 2020-06-05 PROCEDURE — 85027 COMPLETE CBC AUTOMATED: CPT | Mod: ZL | Performed by: INTERNAL MEDICINE

## 2020-06-05 PROCEDURE — 83036 HEMOGLOBIN GLYCOSYLATED A1C: CPT | Mod: ZL | Performed by: INTERNAL MEDICINE

## 2020-06-05 PROCEDURE — 84443 ASSAY THYROID STIM HORMONE: CPT | Mod: ZL | Performed by: INTERNAL MEDICINE

## 2020-06-05 PROCEDURE — 80061 LIPID PANEL: CPT | Mod: ZL | Performed by: INTERNAL MEDICINE

## 2020-06-05 PROCEDURE — 99214 OFFICE O/P EST MOD 30 MIN: CPT | Performed by: INTERNAL MEDICINE

## 2020-06-05 PROCEDURE — 82043 UR ALBUMIN QUANTITATIVE: CPT | Mod: ZL | Performed by: INTERNAL MEDICINE

## 2020-06-05 PROCEDURE — 81001 URINALYSIS AUTO W/SCOPE: CPT | Mod: ZL | Performed by: INTERNAL MEDICINE

## 2020-06-05 PROCEDURE — 36415 COLL VENOUS BLD VENIPUNCTURE: CPT | Mod: ZL | Performed by: INTERNAL MEDICINE

## 2020-06-05 RX ORDER — LISINOPRIL 5 MG/1
5 TABLET ORAL DAILY
Qty: 90 TABLET | Refills: 3 | Status: SHIPPED | OUTPATIENT
Start: 2020-06-05 | End: 2021-04-13

## 2020-06-05 RX ORDER — PRAVASTATIN SODIUM 40 MG
40 TABLET ORAL AT BEDTIME
Qty: 90 TABLET | Refills: 4 | Status: SHIPPED | OUTPATIENT
Start: 2020-06-05 | End: 2021-04-13

## 2020-06-05 RX ORDER — GLIPIZIDE AND METFORMIN HCL 2.5; 5 MG/1; MG/1
2 TABLET, FILM COATED ORAL
Qty: 360 TABLET | Refills: 3 | Status: SHIPPED | OUTPATIENT
Start: 2020-06-05 | End: 2021-04-13

## 2020-06-05 RX ORDER — GABAPENTIN 100 MG/1
100 CAPSULE ORAL 4 TIMES DAILY PRN
Qty: 120 CAPSULE | Refills: 2 | Status: SHIPPED | OUTPATIENT
Start: 2020-06-05 | End: 2021-04-13

## 2020-06-05 ASSESSMENT — ENCOUNTER SYMPTOMS
BACK PAIN: 0
ABDOMINAL PAIN: 0
CONFUSION: 0
CHEST TIGHTNESS: 0
CHILLS: 0
ADENOPATHY: 0
SHORTNESS OF BREATH: 0
BRUISES/BLEEDS EASILY: 0
FEVER: 0
NUMBNESS: 1
HEMATURIA: 0
ARTHRALGIAS: 1
WOUND: 0

## 2020-06-05 ASSESSMENT — ANXIETY QUESTIONNAIRES
2. NOT BEING ABLE TO STOP OR CONTROL WORRYING: NOT AT ALL
5. BEING SO RESTLESS THAT IT IS HARD TO SIT STILL: NOT AT ALL
1. FEELING NERVOUS, ANXIOUS, OR ON EDGE: NOT AT ALL
GAD7 TOTAL SCORE: 0
7. FEELING AFRAID AS IF SOMETHING AWFUL MIGHT HAPPEN: NOT AT ALL
IF YOU CHECKED OFF ANY PROBLEMS ON THIS QUESTIONNAIRE, HOW DIFFICULT HAVE THESE PROBLEMS MADE IT FOR YOU TO DO YOUR WORK, TAKE CARE OF THINGS AT HOME, OR GET ALONG WITH OTHER PEOPLE: NOT DIFFICULT AT ALL
6. BECOMING EASILY ANNOYED OR IRRITABLE: NOT AT ALL
3. WORRYING TOO MUCH ABOUT DIFFERENT THINGS: NOT AT ALL

## 2020-06-05 ASSESSMENT — MIFFLIN-ST. JEOR: SCORE: 1910.14

## 2020-06-05 ASSESSMENT — PATIENT HEALTH QUESTIONNAIRE - PHQ9
SUM OF ALL RESPONSES TO PHQ QUESTIONS 1-9: 0
5. POOR APPETITE OR OVEREATING: NOT AT ALL

## 2020-06-05 ASSESSMENT — PAIN SCALES - GENERAL: PAINLEVEL: NO PAIN (0)

## 2020-06-05 NOTE — PROGRESS NOTES
"Nursing Notes:   Ness Isidro LPN  6/5/2020  8:20 AM  Signed  Patient presents to the clinic for diabetes management.      Previous A1C is at goal of <8  Lab Results   Component Value Date    A1C 6.7 06/04/2018     Urine microalbumin:creatine: n/a  Foot exam today  Eye exam a year ago    Tobacco User no  Patient is on a daily aspirin  Patient is on a Statin.  Blood pressure today of:     BP Readings from Last 1 Encounters:   10/07/19 136/88      is at the goal of <139/89 for diabetics.  Chief Complaint   Patient presents with     Diabetes       Initial /80 (BP Location: Right arm, Patient Position: Sitting, Cuff Size: Adult Regular)   Pulse 76   Temp 98.5  F (36.9  C) (Tympanic)   Resp 16   Ht 1.778 m (5' 10\")   Wt 96.4 kg (212 lb 8 oz)   BMI 30.49 kg/m   Estimated body mass index is 30.49 kg/m  as calculated from the following:    Height as of this encounter: 1.778 m (5' 10\").    Weight as of this encounter: 96.4 kg (212 lb 8 oz).  Medication Reconciliation: complete    Ness Isidro LPN      Nursing note reviewed with patient.  Accuracy and completeness verified.   Mr. Hoskins is a 34 year old male who:  Patient presents with:  Diabetes      ICD-10-CM    1. Controlled type 2 diabetes mellitus with diabetic polyneuropathy, without long-term current use of insulin (H)  E11.42 blood glucose (NO BRAND SPECIFIED) test strip     Albumin Random Urine Quantitative with Creat Ratio     Hemoglobin A1c     CBC with platelets     Comprehensive metabolic panel     UA reflex to Microscopic     TSH Reflex GH     glipiZIDE-metFORMIN (METAGLIP) 2.5-500 MG tablet     order for DME     TSH Reflex GH     Comprehensive metabolic panel     CBC with platelets     Hemoglobin A1c   2. Painful diabetic neuropathy (H)  E11.40 gabapentin (NEURONTIN) 100 MG capsule     order for DME   3. Pre-ulcerative calluses  L84 order for DME   4. Gastroesophageal reflux disease, esophagitis presence not specified  K21.9 omeprazole " (PRILOSEC) 20 MG DR capsule   5. Benign essential hypertension  I10 lisinopril (ZESTRIL) 5 MG tablet   6. Mixed hyperlipidemia  E78.2 pravastatin (PRAVACHOL) 40 MG tablet     Lipid Profile     Lipid Profile     HPI  Patient presents for diabetic follow-up.  He has not had lab work in a long time.  States that he still checks his blood sugars about twice a day.  Has been having some higher blood sugar readings recently.  States his average sugar has been approximately 150-180.  Has had blood sugars as high as 250 recently as well.  He was on more medications for his diabetes in the past but then he ended up losing a bunch of weight and his medication dosing was reduced.  He has gained some of that weight back and now with the virus pandemic his diet has not been great so his blood sugars have been running higher.  He is due for lab work today.  Standing orders placed.  Switch from plain metformin 2000 mg/day over to glipizide-metformin combination 5- 1000 mg twice daily.  Diabetic foot exam today showed pre-ulcerative calluses and some neuropathy, he reports pain in the feet/neuropathy.  Start trial of gabapentin.  Diabetic shoe prescription advised, given his diabetic neuropathy and pre-ulcerative calluses; prescription printed.  Heartburn and reflux, much improved with omeprazole.  Needs refills.    Hypertension, blood pressures are currently well controlled.  Doing well with 5 mg lisinopril.    Check renal function and metabolic panel, needs medication refills.    Mixed hyperlipidemia, currently on pravastatin 40 mg daily.  Tolerating well.  No side effects reported.  Needs refills.  Check labs.    Has had some left ankle pain, using naproxen twice daily as needed.  Encouraged orthopedic evaluation if symptoms worsen or persist.    Functional Capacity: > 4 METS.   Review of Systems   Constitutional: Negative for chills and fever.   HENT: Negative for congestion.    Eyes: Negative for visual disturbance.  "  Respiratory: Negative for chest tightness and shortness of breath.    Cardiovascular: Negative for chest pain.   Gastrointestinal: Negative for abdominal pain.   Endocrine:        High glucose levels intermittently   Genitourinary: Negative for hematuria.   Musculoskeletal: Positive for arthralgias (some joint stiffness. feels like hand  is intermittently weak + left ankle pain, using naproxen twice daily as needed). Negative for back pain.   Skin: Negative for rash and wound.        Foot pre-ulcerative callouses   Neurological: Positive for numbness (with neuropathy burning/tingling or stabbing pain in feet intermittently). Negative for syncope.   Hematological: Negative for adenopathy. Does not bruise/bleed easily.   Psychiatric/Behavioral: Negative for confusion.        Reviewed and updated as needed this visit by Provider   Tobacco  Allergies  Meds  Problems  Med Hx  Surg Hx  Fam Hx         EXAM:   Vitals:    06/05/20 0807   BP: 136/80   BP Location: Right arm   Patient Position: Sitting   Cuff Size: Adult Regular   Pulse: 76   Resp: 16   Temp: 98.5  F (36.9  C)   TempSrc: Tympanic   Weight: 96.4 kg (212 lb 8 oz)   Height: 1.778 m (5' 10\")       Current Pain Score: No Pain (0)     BP Readings from Last 3 Encounters:   06/05/20 136/80   10/07/19 136/88   08/18/18 (!) 163/104      Wt Readings from Last 3 Encounters:   06/05/20 96.4 kg (212 lb 8 oz)   10/07/19 95.5 kg (210 lb 9.6 oz)   08/18/18 93 kg (205 lb)      Estimated body mass index is 30.49 kg/m  as calculated from the following:    Height as of this encounter: 1.778 m (5' 10\").    Weight as of this encounter: 96.4 kg (212 lb 8 oz).     Physical Exam  Constitutional:       General: He is not in acute distress.     Appearance: He is well-developed. He is not diaphoretic.   HENT:      Head: Normocephalic and atraumatic.   Eyes:      General: No scleral icterus.     Conjunctiva/sclera: Conjunctivae normal.   Neck:      Musculoskeletal: Neck " supple.   Cardiovascular:      Rate and Rhythm: Normal rate and regular rhythm.   Pulmonary:      Effort: Pulmonary effort is normal.      Breath sounds: Normal breath sounds.   Abdominal:      Palpations: Abdomen is soft.      Tenderness: There is no abdominal tenderness.   Musculoskeletal:         General: No deformity.      Right lower leg: No edema.      Left lower leg: No edema.   Lymphadenopathy:      Cervical: No cervical adenopathy.   Skin:     General: Skin is warm and dry.      Findings: No rash.      Comments: Diabetic Foot Exam:  Findings:   LEFT: normal DP and PT pulses, reduced sensation at MTP and toes and trophic changes at MTP joints and toes    RIGHT: normal DP and PT pulses, reduced sensation at MTP and toes and trophic changes at MTP joints and toes   Neurological:      Mental Status: He is alert and oriented to person, place, and time. Mental status is at baseline.   Psychiatric:         Mood and Affect: Mood normal.         Behavior: Behavior normal.        Procedures   INVESTIGATIONS:  - Labs reviewed in [facility]     ASSESSMENT AND PLAN:  Problem List Items Addressed This Visit        Nervous and Auditory    Controlled type 2 diabetes mellitus with diabetic polyneuropathy, without long-term current use of insulin (H) - Primary    Relevant Medications    blood glucose (NO BRAND SPECIFIED) test strip    glipiZIDE-metFORMIN (METAGLIP) 2.5-500 MG tablet    gabapentin (NEURONTIN) 100 MG capsule    order for DME    Other Relevant Orders    Albumin Random Urine Quantitative with Creat Ratio    Hemoglobin A1c    CBC with platelets    Comprehensive metabolic panel    UA reflex to Microscopic    TSH Reflex GH    Painful diabetic neuropathy (H)    Relevant Medications    blood glucose (NO BRAND SPECIFIED) test strip    glipiZIDE-metFORMIN (METAGLIP) 2.5-500 MG tablet    gabapentin (NEURONTIN) 100 MG capsule    order for DME       Digestive    Gastroesophageal reflux disease, esophagitis presence not  specified    Relevant Medications    omeprazole (PRILOSEC) 20 MG DR capsule       Endocrine    Mixed hyperlipidemia    Relevant Medications    pravastatin (PRAVACHOL) 40 MG tablet    Other Relevant Orders    Lipid Profile       Circulatory    Benign essential hypertension    Relevant Medications    lisinopril (ZESTRIL) 5 MG tablet       Musculoskeletal and Integumentary    Pre-ulcerative calluses    Relevant Medications    order for DME        reviewed diet, exercise and weight control, cardiovascular risk and specific lipid/LDL goals reviewed, specific diabetic recommendations low cholesterol diet, weight control and daily exercise discussed, use of aspirin to prevent MI and TIA's discussed  -- Expected clinical course discussed    -- Medications and their side effects discussed    The ASCVD Risk score (Brooklynjeaneth SANTIAGO JrAnjel, et al., 2013) failed to calculate for the following reasons:    The 2013 ASCVD risk score is only valid for ages 40 to 79    Patient Instructions     If left ankle becomes more of a problem... consider ortho evaluation.     [facility]   Phone: [phone]    Medications refilled / changed today. See list.     If glucose starts to get too low... can change part of your Glipizide-metformin back to plain metformin.     Labs today.     Rx for Diabetic shoes printed... you have some neuropathy and pre-ulcerative callouses on your feet.   Check your feet daily.     Return for Diabetes labs and clinic follow-up appointment every 3 to 4 months.  --- (Go for about 91 to 100 days)    Aspects of Diabetes we can improve:  Hemoglobin A1c Lab Results   Component Value Date    A1C 6.7 06/04/2018    Goal range is under 8. Best is 6.5 to 7   Blood Pressure 136/80 Goal to keep less than 140/90   Tobacco  reports that he quit smoking about 2 years ago. He has a 1.50 pack-year smoking history. He has never used smokeless tobacco. Goal to abstain from tobacco   Eye Exam -- Do Yearly -- Annual diabetic eye exam    Healthy weight Body mass index is 30.49 kg/m . Goal BMI under 30, best is under 25.      -- Trying to exercise daily (goal at least 20 min/day) with moderate aerobic activity   -- Eat healthy (resources from ADA at http://www.diabetes.org/)   -- Taking good care of my feet. Consider seeing the Podiatrist   -- Check blood sugars as directed, record in log book and bring to every appointment      Schedule lab only appointment --- A few days AFTER: 9/3/20   Schedule clinic appointment with Dr. Kunz -- Same day as labs, or 1-2 days later.     Insurance companies are now grading you and I on your blood sugar control -- Goal is to get your A1c down to 7.9% or lower and NO Smoking!    -- Medicare and most insurance companies, will only cover Hemoglobin A1c labs to be rechecked every 91+ days.      Hemoglobin A1C   Date Value Ref Range Status   06/04/2018 6.7 (H) 4.0 - 6.0 % Final       Next follow-up appointment with Dr. Kunz should be scheduled:  -- Approximately a few days AFTER: 9/3/20      All Kunz MD   Internal Medicine  [facility]     Portions of this note were dictated using speech recognition software. The note has been proofread but errors in the text may have been overlooked. Please contact me if there are any concerns regarding the accuracy of the dictation.

## 2020-06-05 NOTE — PATIENT INSTRUCTIONS
If left ankle becomes more of a problem... consider ortho evaluation.     [facility]   Phone: [phone]    Medications refilled / changed today. See list.     If glucose starts to get too low... can change part of your Glipizide-metformin back to plain metformin.     Labs today.     Rx for Diabetic shoes printed... you have some neuropathy and pre-ulcerative callouses on your feet.   Check your feet daily.     Return for Diabetes labs and clinic follow-up appointment every 3 to 4 months.  --- (Go for about 91 to 100 days)    Aspects of Diabetes we can improve:  Hemoglobin A1c Lab Results   Component Value Date    A1C 6.7 06/04/2018    Goal range is under 8. Best is 6.5 to 7   Blood Pressure 136/80 Goal to keep less than 140/90   Tobacco  reports that he quit smoking about 2 years ago. He has a 1.50 pack-year smoking history. He has never used smokeless tobacco. Goal to abstain from tobacco   Eye Exam -- Do Yearly -- Annual diabetic eye exam   Healthy weight Body mass index is 30.49 kg/m . Goal BMI under 30, best is under 25.      -- Trying to exercise daily (goal at least 20 min/day) with moderate aerobic activity   -- Eat healthy (resources from ADA at http://www.diabetes.org/)   -- Taking good care of my feet. Consider seeing the Podiatrist   -- Check blood sugars as directed, record in log book and bring to every appointment      Schedule lab only appointment --- A few days AFTER: 9/3/20   Schedule clinic appointment with Dr. Kunz -- Same day as labs, or 1-2 days later.     Insurance companies are now grading you and I on your blood sugar control -- Goal is to get your A1c down to 7.9% or lower and NO Smoking!    -- Medicare and most insurance companies, will only cover Hemoglobin A1c labs to be rechecked every 91+ days.      Hemoglobin A1C   Date Value Ref Range Status   06/04/2018 6.7 (H) 4.0 - 6.0 % Final       Next follow-up appointment with Dr. Kunz should be scheduled:  --  Approximately a few days AFTER: 9/3/20

## 2020-06-05 NOTE — NURSING NOTE
"Patient presents to the clinic for diabetes management.      Previous A1C is at goal of <8  Lab Results   Component Value Date    A1C 6.7 06/04/2018     Urine microalbumin:creatine: n/a  Foot exam today  Eye exam a year ago    Tobacco User no  Patient is on a daily aspirin  Patient is on a Statin.  Blood pressure today of:     BP Readings from Last 1 Encounters:   10/07/19 136/88      is at the goal of <139/89 for diabetics.  Chief Complaint   Patient presents with     Diabetes       Initial /80 (BP Location: Right arm, Patient Position: Sitting, Cuff Size: Adult Regular)   Pulse 76   Temp 98.5  F (36.9  C) (Tympanic)   Resp 16   Ht 1.778 m (5' 10\")   Wt 96.4 kg (212 lb 8 oz)   BMI 30.49 kg/m   Estimated body mass index is 30.49 kg/m  as calculated from the following:    Height as of this encounter: 1.778 m (5' 10\").    Weight as of this encounter: 96.4 kg (212 lb 8 oz).  Medication Reconciliation: complete    Ness Isidro LPN      "

## 2020-06-05 NOTE — LETTER
June 9, 2020       Sanjay Hoskins  [id number]   [place] [address]        Dear ,    We are writing to inform you of your test results.    Labs are stable. Continue current medications.   Plan to recheck labs again in 3 to 4 months.     If your blood sugar levels start to get too low, let me know and we can switch to half of your pills containing Glipizide-Metformin combo --- and half of them plain Metformin.    All Kunz MD      Resulted Orders   TSH Reflex GH   Result Value Ref Range    TSH Reflex 1.80 0.34 - 5.60 IU/mL   Lipid Profile   Result Value Ref Range    Cholesterol 187 <200 mg/dL    Triglycerides 140 <150 mg/dL    HDL Cholesterol 51 23 - 92 mg/dL    LDL Cholesterol Calculated 108 (H) <100 mg/dL      Comment:      Above desirable:  100-129 mg/dl  Borderline High:  130-159 mg/dL  High:             160-189 mg/dL  Very high:       >189 mg/dl      Non HDL Cholesterol 136 (H) <130 mg/dL      Comment:      Above Desirable:  130-159 mg/dl  Borderline high:  160-189 mg/dl  High:             190-219 mg/dl  Very high:       >219 mg/dl     Comprehensive metabolic panel   Result Value Ref Range    Sodium 138 134 - 144 mmol/L    Potassium 4.8 3.5 - 5.1 mmol/L    Chloride 102 98 - 107 mmol/L    Carbon Dioxide 30 21 - 31 mmol/L    Anion Gap 6 3 - 14 mmol/L    Glucose 169 (H) 70 - 105 mg/dL    Urea Nitrogen 17 7 - 25 mg/dL    Creatinine 0.70 0.70 - 1.30 mg/dL    GFR Estimate >90 >60 mL/min/[1.73_m2]    GFR Estimate If Black >90 >60 mL/min/[1.73_m2]    Calcium 9.6 8.6 - 10.3 mg/dL    Bilirubin Total 1.0 0.3 - 1.0 mg/dL    Albumin 4.7 3.5 - 5.7 g/dL    Protein Total 7.7 6.4 - 8.9 g/dL    Alkaline Phosphatase 64 34 - 104 U/L    ALT 63 (H) 7 - 52 U/L    AST 26 13 - 39 U/L   CBC with platelets   Result Value Ref Range    WBC 7.3 4.0 - 11.0 10e9/L    RBC Count 5.48 4.4 - 5.9 10e12/L    Hemoglobin 15.2 13.3 - 17.7 g/dL    Hematocrit 45.2 40.0 - 53.0 %    MCV 83 78 - 100 fl    MCH 27.7 26.5 - 33.0 pg     MCHC 33.6 31.5 - 36.5 g/dL    RDW 12.4 10.0 - 15.0 %    Platelet Count 225 150 - 450 10e9/L   Hemoglobin A1c   Result Value Ref Range    Hemoglobin A1C 6.5 (H) 4.0 - 6.0 %   UA reflex to Microscopic   Result Value Ref Range    Color Urine Light Yellow     Appearance Urine Clear     Glucose Urine >1000 (A) NEG^Negative mg/dL    Bilirubin Urine Negative NEG^Negative    Ketones Urine 10 (A) NEG^Negative mg/dL    Specific Gravity Urine 1.031 1.003 - 1.035    Blood Urine Negative NEG^Negative    pH Urine 6.5 5.0 - 7.0 pH    Protein Albumin Urine 10 (A) NEG^Negative mg/dL    Urobilinogen mg/dL Normal 0.0 - 2.0 mg/dL    Nitrite Urine Negative NEG^Negative    Leukocyte Esterase Urine Negative NEG^Negative    Source Midstream Urine     RBC Urine <1 0 - 2 /HPF    WBC Urine <1 0 - 5 /HPF   Albumin Random Urine Quantitative with Creat Ratio   Result Value Ref Range    Creatinine Urine 100 mg/dL    Albumin Urine mg/L 18 mg/L    Albumin Urine mg/g Cr 18.30 (H) 0 - 17 mg/g Cr       If you have any questions or concerns, please call the clinic at the number listed above.       Sincerely,        All Kunz MD

## 2020-06-06 ASSESSMENT — ANXIETY QUESTIONNAIRES: GAD7 TOTAL SCORE: 0

## 2020-09-21 ENCOUNTER — OFFICE VISIT (OUTPATIENT)
Dept: PEDIATRICS | Facility: OTHER | Age: 35
End: 2020-09-21
Attending: INTERNAL MEDICINE
Payer: COMMERCIAL

## 2020-09-21 VITALS
OXYGEN SATURATION: 97 % | HEART RATE: 98 BPM | WEIGHT: 216.8 LBS | DIASTOLIC BLOOD PRESSURE: 80 MMHG | TEMPERATURE: 99 F | RESPIRATION RATE: 16 BRPM | BODY MASS INDEX: 31.11 KG/M2 | SYSTOLIC BLOOD PRESSURE: 134 MMHG

## 2020-09-21 DIAGNOSIS — H61.23 BILATERAL IMPACTED CERUMEN: Primary | ICD-10-CM

## 2020-09-21 PROCEDURE — 99213 OFFICE O/P EST LOW 20 MIN: CPT | Mod: 25 | Performed by: INTERNAL MEDICINE

## 2020-09-21 PROCEDURE — 69209 REMOVE IMPACTED EAR WAX UNI: CPT | Mod: 50 | Performed by: INTERNAL MEDICINE

## 2020-09-21 ASSESSMENT — PAIN SCALES - GENERAL: PAINLEVEL: MODERATE PAIN (5)

## 2020-09-21 NOTE — NURSING NOTE
"Chief Complaint   Patient presents with     Ear Problem     Right ear pain      Patient is here for right ear pain that he has had on and off since last week    Initial /80 (BP Location: Right arm, Patient Position: Sitting, Cuff Size: Adult Large)   Pulse 98   Temp 99  F (37.2  C) (Tympanic)   Resp 16   Wt 98.3 kg (216 lb 12.8 oz)   SpO2 97%   BMI 31.11 kg/m   Estimated body mass index is 31.11 kg/m  as calculated from the following:    Height as of 6/5/20: 1.778 m (5' 10\").    Weight as of this encounter: 98.3 kg (216 lb 12.8 oz).  Medication Reconciliation: complete    Moon Nichols MA  "

## 2020-09-21 NOTE — PROGRESS NOTES
Subjective  Sanjay Hoskins is a 34 year old male who presents for possible ear infection.  For the last week he has had a fluctuating pain on the right side.  He does not know if it is wax or an infection.  He has diabetes mellitus type 2 and his blood sugars have been well controlled.  Describes it as an aching sensation occasionally sharp.  He is been taking 1 or 2 Aleve twice a day.  No discharge or fever.    Allergies: reviewed in EMR  Medications: reviewed in EMR  Problem List/PMH:reviewed in EMR    Social Hx:  Social History     Social History Narrative    Lives independently.       Worked at [employer] in [employer]  -- worked at [employer] in [place], now working at [employer].      I reviewed social history and made relevant updates today.    Family Hx:   Family History   Problem Relation Age of Onset     Diabetes Mother         Diabetes     Family History Negative Father         Emphysema - / as younger     Arthritis Father         Arthritis,RA?       Objective  Vitals: reviewed in EMR.  /80 (BP Location: Right arm, Patient Position: Sitting, Cuff Size: Adult Large)   Pulse 98   Temp 99  F (37.2  C) (Tympanic)   Resp 16   Wt 98.3 kg (216 lb 12.8 oz)   SpO2 97%   BMI 31.11 kg/m      Gen: Pleasant male, NAD.  HEENT: MMM, left tympanic membrane initially obscured by cerumen is visible after flushing by the nurse with slight erythema no pus.  Right tympanic membrane continues to show impacted cerumen after extensive flushing by the nurse  Neck: Supple  Pulm: Breathing easily  Neuro: Grossly intact  Skin: No concerning lesions.  Psychiatric: Normal affect and insight. Does not appear anxious or depressed.    Assessment    ICD-10-CM    1. Bilateral impacted cerumen  H61.23        Cerumen was impacted today.  After significant amount of flushing I was able to visualize the left tympanic membrane which appears within normal limits.  The right side continues to be obscured.  I  think impacted cerumen remains his cause of symptoms.  We discussed the possibility of using a curette to remove the wax and decided against it.  I recommend that he use Debrox drops and come back in a week.    Plan   --Bilateral external ear canal flushed by the nurse today   -- No cotton-swab in ear   --Start debrox ear drops to right ear   -- Follow-up in 1 week    Signed, Radhames Morris MD  InternalMedicine & Pediatrics

## 2020-09-29 ENCOUNTER — OFFICE VISIT (OUTPATIENT)
Dept: PEDIATRICS | Facility: OTHER | Age: 35
End: 2020-09-29
Attending: INTERNAL MEDICINE
Payer: COMMERCIAL

## 2020-09-29 VITALS
HEART RATE: 83 BPM | TEMPERATURE: 98.6 F | DIASTOLIC BLOOD PRESSURE: 86 MMHG | RESPIRATION RATE: 16 BRPM | BODY MASS INDEX: 31.58 KG/M2 | WEIGHT: 220.1 LBS | SYSTOLIC BLOOD PRESSURE: 130 MMHG | OXYGEN SATURATION: 95 %

## 2020-09-29 DIAGNOSIS — H91.90 DECREASED HEARING, UNSPECIFIED LATERALITY: ICD-10-CM

## 2020-09-29 DIAGNOSIS — H61.21 CERUMINOSIS, RIGHT: Primary | ICD-10-CM

## 2020-09-29 DIAGNOSIS — H65.491 COME (CHRONIC OTITIS MEDIA WITH EFFUSION), RIGHT: ICD-10-CM

## 2020-09-29 DIAGNOSIS — S09.93XD: ICD-10-CM

## 2020-09-29 PROCEDURE — 99213 OFFICE O/P EST LOW 20 MIN: CPT | Performed by: INTERNAL MEDICINE

## 2020-09-29 ASSESSMENT — PAIN SCALES - GENERAL: PAINLEVEL: NO PAIN (0)

## 2020-09-29 NOTE — PROGRESS NOTES
Subjective  Sanjay Hoskins is a 34 year old male who presents for follow-up ears.  Last visit was .  Since then has been using Debrox drops.  It stopped hurting.  After the shower he has been using a Q-tip to get a little bit of pieces out.  He has been having problems going back many years.  When he had his tonsils removed he was told that his palate was injured.  He is noticed problems with his hearing going back many years.    Problem List/PMH: reviewed in EMR, and made relevant updates today.  Medications: reviewed in EMR, and made relevant updates today.  Allergies: reviewed in EMR, and made relevant updates today.    Social Hx:  Social History     Tobacco Use     Smoking status: Former Smoker     Packs/day: 0.25     Years: 6.00     Pack years: 1.50     Last attempt to quit: 2018     Years since quittin.4     Smokeless tobacco: Never Used   Substance Use Topics     Alcohol use: Yes     Alcohol/week: 6.0 standard drinks     Comment: 1-2 times on the weekends     Drug use: Never     Social History     Social History Narrative    Lives independently.       Worked at [employer] in [place]  -- worked at [employer] in [place], now working at [employer].      I reviewed social history and made relevant updates today.    Family Hx:   Family History   Problem Relation Age of Onset     Diabetes Mother         Diabetes     Family History Negative Father         Emphysema - / as younger     Arthritis Father         Arthritis,RA?       Objective  Vitals: reviewed in EMR.  /86 (BP Location: Right arm, Patient Position: Sitting, Cuff Size: Adult Regular)   Pulse 83   Temp 98.6  F (37  C) (Tympanic)   Resp 16   Wt 99.8 kg (220 lb 1.6 oz)   SpO2 95%   BMI 31.58 kg/m      Gen: Pleasant male, NAD.  HEENT: MMM, left tympanic membrane is normal.  Right tympanic membrane initially obscured by cerumen however after flushing by the nurse the membrane contains a small amount of  fluid and bony landmarks are not well seen.  Neck: Supple  Pulm: Breathing easily  Neuro: Grossly intact  Skin: No concerning lesions.  Psychiatric: Normal affect and insight. Does not appear anxious or depressed.        Assessment    ICD-10-CM    1. Ceruminosis, right  H61.21 OTOLARYNGOLOGY REFERRAL     AUDIOLOGY ADULT REFERRAL   2. COME (chronic otitis media with effusion), right  H65.491 OTOLARYNGOLOGY REFERRAL     AUDIOLOGY ADULT REFERRAL   3. Soft palate injury, subsequent encounter  S09.93XD OTOLARYNGOLOGY REFERRAL     AUDIOLOGY ADULT REFERRAL   4. Decreased hearing, unspecified laterality  H91.90 OTOLARYNGOLOGY REFERRAL     AUDIOLOGY ADULT REFERRAL     Orders Placed This Encounter   Procedures     OTOLARYNGOLOGY REFERRAL     AUDIOLOGY ADULT REFERRAL       He initially had ceruminosis which was impacted and required flushing by the nurse.  Subsequently it looks as though he has a middle ear effusion.  It is unclear how much this previous soft palate injury plays a role.  He is describing reduced hearing for a long period of time.  I recommend expert consultation to investigate these etiologies.    Plan   -- Expected clinical course discussed   --Avoid Q-tips in the ear   --Debrox wax drops every once in a while   --ENT and audiology consultation    Signed, Radhames Morris MD, FAAP, FACP  Internal Medicine & Pediatrics

## 2020-09-29 NOTE — NURSING NOTE
"Chief Complaint   Patient presents with     Follow Up     Seen 9/21     Ear Problem     Patient is here for a follow up on his ears. He was in clinic 9/21/2020 and had bilateral ear wash. A chunk of ear wax came out of his left ear but nurse was unable to get ear wax out of his right ear.     Initial There were no vitals taken for this visit. Estimated body mass index is 31.11 kg/m  as calculated from the following:    Height as of 6/5/20: 1.778 m (5' 10\").    Weight as of 9/21/20: 98.3 kg (216 lb 12.8 oz).  Medication Reconciliation: complete    Moon Nichols MA  "

## 2020-09-29 NOTE — NURSING NOTE
The ear canal was irrigated with body-temperature tap water with the jet of water directed superiorly.  The ear canal was then re-examined and cleared of the impaction.  The patient tolerated the procedure well.  Franca Almaraz LPN  9/29/2020 4:10 PM

## 2020-11-04 ENCOUNTER — VIRTUAL VISIT (OUTPATIENT)
Dept: FAMILY MEDICINE | Facility: OTHER | Age: 35
End: 2020-11-04
Payer: COMMERCIAL

## 2020-11-04 VITALS — TEMPERATURE: 98.5 F

## 2020-11-04 DIAGNOSIS — Z20.822 SUSPECTED COVID-19 VIRUS INFECTION: Primary | ICD-10-CM

## 2020-11-04 LAB
SARS-COV-2 RNA SPEC QL NAA+PROBE: NORMAL
SPECIMEN SOURCE: NORMAL

## 2020-11-04 PROCEDURE — 99212 OFFICE O/P EST SF 10 MIN: CPT | Mod: 95 | Performed by: NURSE PRACTITIONER

## 2020-11-04 ASSESSMENT — PAIN SCALES - GENERAL: PAINLEVEL: MODERATE PAIN (4)

## 2020-11-04 NOTE — PROGRESS NOTES
"Sanjay Hoskins is a 35 year old male who is being evaluated via a billable telephone visit.    Patient started vomiting early this morning. Patient has had a cough nonproductive but gagging starting this morning. Patient has had a headache all day.     The patient has been notified of following:     \"This telephone visit will be conducted via a call between you and your physician/provider. We have found that certain health care needs can be provided without the need for a physical exam.  This service lets us provide the care you need with a short phone conversation.  If a prescription is necessary we can send it directly to your pharmacy.  If lab work is needed we can place an order for that and you can then stop by our lab to have the test done at a later time.    Telephone visits are billed at different rates depending on your insurance coverage. During this emergency period, for some insurers they may be billed the same as an in-person visit.  Please reach out to your insurance provider with any questions.    If during the course of the call the physician/provider feels a telephone visit is not appropriate, you will not be charged for this service.\"    Patient has given verbal consent for Telephone visit?  Yes    What phone number would you like to be contacted at? [phone]    How would you like to obtain your AVS? Jennahart    Subjective     Sanjay Hoskins is a 35 year old male who presents via phone visit today for the following health issues:    HPI    The patient states that he began having symptoms today. The patient is not sure if he has had any COVID exposure.   The patient states that he has been having chills, sore throat, vomiting and a headache.     Review of Systems   See HPI       Objective      Suspected COVID exposure:  Known COVID exposure:  Fevers or chills: yes, chills  Nasal congestion or drainage: No  Cough: yes   Chest tightness or heaviness: No  Shortness of breath: No  Sore throat: " yes  Nausea or vomiting: yes  Diarrhea: No  Loss of taste or smell: No  Headaches: yes  Body aches: No  Fatigue: No  Previous covid test: no      Vitals:  No vitals were obtained today due to virtual visit.    healthy, alert and no distress  PSYCH: Alert and oriented times 3; coherent speech, normal   rate and volume, able to articulate logical thoughts, able   to abstract reason, no tangential thoughts, no hallucinations   or delusions  His affect is normal  RESP: No cough, no audible wheezing, able to talk in full sentences  Remainder of exam unable to be completed due to telephone visits            Assessment/Plan:    Assessment & Plan   Problem List Items Addressed This Visit     None      Visit Diagnoses     Suspected COVID-19 virus infection    -  Primary    Relevant Orders    Symptomatic COVID-19 Virus (Coronavirus) by PCR         Come to clinic for curide COVID-19 test, phone number provided and process discussed.    Self quarantine until test results are available and continue if positive.    Instructed to limit movements outside of home as much as possible, social distance, mask in public spaces, and monitor closely for COVID-19 symptoms      Discussed warning signs/symptoms indicative of need to f/u  Come to be seen in clinic if worsening or concerns         No follow-ups on file.    Clotilde Riojas NP  [facility]    Phone call duration:  5 minutes

## 2020-11-05 ENCOUNTER — ALLIED HEALTH/NURSE VISIT (OUTPATIENT)
Dept: FAMILY MEDICINE | Facility: OTHER | Age: 35
End: 2020-11-05
Attending: NURSE PRACTITIONER
Payer: COMMERCIAL

## 2020-11-05 DIAGNOSIS — Z20.822 COVID-19 RULED OUT: Primary | ICD-10-CM

## 2020-11-05 PROCEDURE — 99207 PR NO CHARGE NURSE ONLY: CPT

## 2020-11-05 PROCEDURE — C9803 HOPD COVID-19 SPEC COLLECT: HCPCS

## 2020-11-05 PROCEDURE — U0003 INFECTIOUS AGENT DETECTION BY NUCLEIC ACID (DNA OR RNA); SEVERE ACUTE RESPIRATORY SYNDROME CORONAVIRUS 2 (SARS-COV-2) (CORONAVIRUS DISEASE [COVID-19]), AMPLIFIED PROBE TECHNIQUE, MAKING USE OF HIGH THROUGHPUT TECHNOLOGIES AS DESCRIBED BY CMS-2020-01-R: HCPCS | Mod: ZL | Performed by: INTERNAL MEDICINE

## 2020-11-05 NOTE — LETTER
November 6, 2020      Sanjay Hoskins  [address] [place] [address]        Dear ,    We are writing to inform you of your test results.    Negative covid testing.     All Kunz MD     Resulted Orders   Symptomatic COVID-19 Virus (Coronavirus) by PCR   Result Value Ref Range    COVID-19 Virus PCR to [facility] - Source Nasopharyngeal     COVID-19 Virus PCR to [facility] - Result Not Detected       Comment:      Collection of multiple specimens from the same patient may be necessary to   detect the virus. The possibility of a false negative should be considered if   the patient's recent exposure or clinical presentation suggests 2019 nCOV   infection and diagnostic tests for other causes of illness are negative.   Repeat testing may be considered in this setting.  Patient sample was heat inactivated and amplified using the HDPCR SARS-CoV-2   assay ([employer] The HDPCRTM SARS-CoV-2 assay is a reverse   transcription real-time polymerase chain reaction (qRT-PCR) test intended for   the qualitative detection of nucleic acid  from SARS-CoV-2 in human nasopharyngeal swabs, oropharyngeal swabs, anterior   nasal swabs, mid-turbinate nasal swabs as well as nasal aspirate, nasal wash,   and bronchoalveolar lavage (BAL) specimens from individuals who are suspected   of COVID-19 by their healthcare provider.  A negative result does not rule out the presence of real-time PCR inhibitors   in the sp ecimen or COVID-19 RNA in concentrations below the limit of detection   of the assay. The possibility of a false negative should be considered if the   patients recent exposure or clinical presentation suggests COVID-19.   Additional testing or repeat testing requires consultation with the   laboratory.  Nasopharyngeal specimen is the preferred choice for swab-based SARS CoV2   testing. When collection of a nasopharyngeal swab is not possible the   following are acceptable alternatives:  an oropharyngeal (OP)  specimen collected by a healthcare professional, or a   nasal mid-turbinate (NMT) swab collected by a healthcare professional or by   onsite self-collection (using a flocked tapered swab), or an anterior nares   specimen collected by a healthcare professional or by onsite self-collection   (using a round foam swab). (Centers for Disease Control)  Testing performed by [facility] ([facility]) [address] [place] [place] [address]  The test performance characteristics were determined by [facility]. It has not been   cleared or approved by the FDA.  The laboratory is regulated under the Clinical Laboratory Improvement   Amendments of 1988 (CLIA-88) as qualified to perform high-complexity testing.   This test is used for clinical purposes. It should not be regarded as   investigational or for research.         If you have any questions or concerns, please call the clinic at the number listed above.       Sincerely,        Tre Barkley

## 2020-11-06 LAB
SARS-COV-2 RNA SPEC QL NAA+PROBE: NOT DETECTED
SPECIMEN SOURCE: NORMAL

## 2020-12-27 ENCOUNTER — HEALTH MAINTENANCE LETTER (OUTPATIENT)
Age: 35
End: 2020-12-27

## 2021-04-06 DIAGNOSIS — E11.40 PAINFUL DIABETIC NEUROPATHY (H): ICD-10-CM

## 2021-04-06 NOTE — TELEPHONE ENCOUNTER
Routing refill request to provider for review/approval because:  Drug not on the [facility] refill protocol     LVO: 9/29/2020    Estela Almaraz RN on 4/6/2021 at 3:29 PM

## 2021-04-07 RX ORDER — GABAPENTIN 100 MG/1
100 CAPSULE ORAL 4 TIMES DAILY PRN
Qty: 120 CAPSULE | Refills: 2 | OUTPATIENT
Start: 2021-04-07

## 2021-04-07 NOTE — TELEPHONE ENCOUNTER
Patient scheduled for lab and DM check on April 13, 2021. Patient states that he does not need an  rx sent in before the appointment date.    Hanna Dorsey on 4/7/2021 at 2:51 PM

## 2021-04-07 NOTE — TELEPHONE ENCOUNTER
Please schedule DM labs and DM follow-up Clinic Appointment/med refills.     We can send in 7 to 14 day Rx to desired pharmacy if needed.     All Kunz MD

## 2021-04-09 ENCOUNTER — TELEPHONE (OUTPATIENT)
Dept: INTERNAL MEDICINE | Facility: OTHER | Age: 36
End: 2021-04-09

## 2021-04-13 ENCOUNTER — OFFICE VISIT (OUTPATIENT)
Dept: INTERNAL MEDICINE | Facility: OTHER | Age: 36
End: 2021-04-13
Attending: INTERNAL MEDICINE
Payer: COMMERCIAL

## 2021-04-13 ENCOUNTER — ALLIED HEALTH/NURSE VISIT (OUTPATIENT)
Dept: FAMILY MEDICINE | Facility: OTHER | Age: 36
End: 2021-04-13
Attending: INTERNAL MEDICINE
Payer: COMMERCIAL

## 2021-04-13 VITALS
DIASTOLIC BLOOD PRESSURE: 80 MMHG | RESPIRATION RATE: 20 BRPM | SYSTOLIC BLOOD PRESSURE: 136 MMHG | TEMPERATURE: 98.9 F | OXYGEN SATURATION: 98 % | WEIGHT: 218 LBS | HEART RATE: 97 BPM | BODY MASS INDEX: 32.29 KG/M2 | HEIGHT: 69 IN

## 2021-04-13 DIAGNOSIS — E11.42 CONTROLLED TYPE 2 DIABETES MELLITUS WITH DIABETIC POLYNEUROPATHY, WITHOUT LONG-TERM CURRENT USE OF INSULIN (H): Primary | ICD-10-CM

## 2021-04-13 DIAGNOSIS — E53.8 VITAMIN B12 DEFICIENCY: ICD-10-CM

## 2021-04-13 DIAGNOSIS — E78.2 MIXED HYPERLIPIDEMIA: ICD-10-CM

## 2021-04-13 DIAGNOSIS — E11.42 CONTROLLED TYPE 2 DIABETES MELLITUS WITH DIABETIC POLYNEUROPATHY, WITHOUT LONG-TERM CURRENT USE OF INSULIN (H): ICD-10-CM

## 2021-04-13 DIAGNOSIS — E55.9 VITAMIN D DEFICIENCY: ICD-10-CM

## 2021-04-13 DIAGNOSIS — R12 HEARTBURN: ICD-10-CM

## 2021-04-13 DIAGNOSIS — Z23 NEED FOR VACCINATION: Primary | ICD-10-CM

## 2021-04-13 DIAGNOSIS — M25.571 PAIN IN JOINT INVOLVING ANKLE AND FOOT, RIGHT: ICD-10-CM

## 2021-04-13 DIAGNOSIS — I10 BENIGN ESSENTIAL HYPERTENSION: ICD-10-CM

## 2021-04-13 DIAGNOSIS — Z87.39 HISTORY OF NECK PAIN: ICD-10-CM

## 2021-04-13 LAB
ALBUMIN SERPL-MCNC: 4.8 G/DL (ref 3.5–5.7)
ALBUMIN UR-MCNC: NEGATIVE MG/DL
ALP SERPL-CCNC: 55 U/L (ref 34–104)
ALT SERPL W P-5'-P-CCNC: 67 U/L (ref 7–52)
ANION GAP SERPL CALCULATED.3IONS-SCNC: 10 MMOL/L (ref 3–14)
APPEARANCE UR: CLEAR
AST SERPL W P-5'-P-CCNC: 26 U/L (ref 13–39)
BILIRUB SERPL-MCNC: 0.8 MG/DL (ref 0.3–1)
BILIRUB UR QL STRIP: NEGATIVE
BUN SERPL-MCNC: 11 MG/DL (ref 7–25)
CALCIUM SERPL-MCNC: 9.2 MG/DL (ref 8.6–10.3)
CHLORIDE SERPL-SCNC: 103 MMOL/L (ref 98–107)
CHOLEST SERPL-MCNC: 177 MG/DL
CO2 SERPL-SCNC: 25 MMOL/L (ref 21–31)
COLOR UR AUTO: YELLOW
CREAT SERPL-MCNC: 0.71 MG/DL (ref 0.7–1.3)
CREAT UR-MCNC: 107 MG/DL
DEPRECATED CALCIDIOL+CALCIFEROL SERPL-MC: 20.6 NG/ML
ERYTHROCYTE [DISTWIDTH] IN BLOOD BY AUTOMATED COUNT: 12.3 % (ref 10–15)
GFR SERPL CREATININE-BSD FRML MDRD: >90 ML/MIN/{1.73_M2}
GLUCOSE SERPL-MCNC: 120 MG/DL (ref 70–105)
GLUCOSE UR STRIP-MCNC: NEGATIVE MG/DL
HBA1C MFR BLD: 6.6 % (ref 4–6)
HCT VFR BLD AUTO: 43.1 % (ref 40–53)
HDLC SERPL-MCNC: 41 MG/DL (ref 23–92)
HGB BLD-MCNC: 15.2 G/DL (ref 13.3–17.7)
HGB UR QL STRIP: NEGATIVE
KETONES UR STRIP-MCNC: ABNORMAL MG/DL
LDLC SERPL CALC-MCNC: 85 MG/DL
LEUKOCYTE ESTERASE UR QL STRIP: NEGATIVE
MCH RBC QN AUTO: 28.9 PG (ref 26.5–33)
MCHC RBC AUTO-ENTMCNC: 35.3 G/DL (ref 31.5–36.5)
MCV RBC AUTO: 82 FL (ref 78–100)
MICROALBUMIN UR-MCNC: 40 MG/L
MICROALBUMIN/CREAT UR: 37.38 MG/G CR (ref 0–17)
NITRATE UR QL: NEGATIVE
NONHDLC SERPL-MCNC: 136 MG/DL
PH UR STRIP: 6 PH (ref 5–9)
PLATELET # BLD AUTO: 228 10E9/L (ref 150–450)
POTASSIUM SERPL-SCNC: 4.3 MMOL/L (ref 3.5–5.1)
PROT SERPL-MCNC: 7.5 G/DL (ref 6.4–8.9)
RBC # BLD AUTO: 5.26 10E12/L (ref 4.4–5.9)
SODIUM SERPL-SCNC: 138 MMOL/L (ref 134–144)
SOURCE: ABNORMAL
SP GR UR STRIP: >1.03 (ref 1–1.03)
TRIGL SERPL-MCNC: 257 MG/DL
TSH SERPL DL<=0.05 MIU/L-ACNC: 2.56 IU/ML (ref 0.34–5.6)
UROBILINOGEN UR STRIP-ACNC: 0.2 EU/DL (ref 0.2–1)
VIT B12 SERPL-MCNC: 604 PG/ML (ref 180–914)
WBC # BLD AUTO: 7.6 10E9/L (ref 4–11)

## 2021-04-13 PROCEDURE — 96372 THER/PROPH/DIAG INJ SC/IM: CPT | Performed by: INTERNAL MEDICINE

## 2021-04-13 PROCEDURE — 82607 VITAMIN B-12: CPT | Mod: ZL | Performed by: INTERNAL MEDICINE

## 2021-04-13 PROCEDURE — 90732 PPSV23 VACC 2 YRS+ SUBQ/IM: CPT

## 2021-04-13 PROCEDURE — 36415 COLL VENOUS BLD VENIPUNCTURE: CPT | Mod: ZL | Performed by: INTERNAL MEDICINE

## 2021-04-13 PROCEDURE — 90471 IMMUNIZATION ADMIN: CPT

## 2021-04-13 PROCEDURE — 85027 COMPLETE CBC AUTOMATED: CPT | Mod: ZL | Performed by: INTERNAL MEDICINE

## 2021-04-13 PROCEDURE — 83036 HEMOGLOBIN GLYCOSYLATED A1C: CPT | Mod: ZL | Performed by: INTERNAL MEDICINE

## 2021-04-13 PROCEDURE — 84443 ASSAY THYROID STIM HORMONE: CPT | Mod: ZL | Performed by: INTERNAL MEDICINE

## 2021-04-13 PROCEDURE — 80053 COMPREHEN METABOLIC PANEL: CPT | Mod: ZL | Performed by: INTERNAL MEDICINE

## 2021-04-13 PROCEDURE — 81003 URINALYSIS AUTO W/O SCOPE: CPT | Mod: ZL | Performed by: INTERNAL MEDICINE

## 2021-04-13 PROCEDURE — 82306 VITAMIN D 25 HYDROXY: CPT | Mod: ZL | Performed by: INTERNAL MEDICINE

## 2021-04-13 PROCEDURE — 99214 OFFICE O/P EST MOD 30 MIN: CPT | Mod: 25 | Performed by: INTERNAL MEDICINE

## 2021-04-13 PROCEDURE — 80061 LIPID PANEL: CPT | Mod: ZL | Performed by: INTERNAL MEDICINE

## 2021-04-13 PROCEDURE — 82043 UR ALBUMIN QUANTITATIVE: CPT | Mod: ZL | Performed by: INTERNAL MEDICINE

## 2021-04-13 PROCEDURE — 250N000011 HC RX IP 250 OP 636: Performed by: INTERNAL MEDICINE

## 2021-04-13 RX ORDER — LISINOPRIL 5 MG/1
5 TABLET ORAL DAILY
Qty: 90 TABLET | Refills: 3 | Status: SHIPPED | OUTPATIENT
Start: 2021-04-13 | End: 2023-08-24

## 2021-04-13 RX ORDER — CHOLECALCIFEROL (VITAMIN D3) 50 MCG
2 TABLET ORAL DAILY
Qty: 180 TABLET | Refills: 3 | Status: SHIPPED | OUTPATIENT
Start: 2021-04-13

## 2021-04-13 RX ORDER — CYANOCOBALAMIN 1000 UG/ML
1000 INJECTION, SOLUTION INTRAMUSCULAR; SUBCUTANEOUS ONCE
Status: COMPLETED | OUTPATIENT
Start: 2021-04-13 | End: 2021-04-13

## 2021-04-13 RX ORDER — DULOXETIN HYDROCHLORIDE 30 MG/1
CAPSULE, DELAYED RELEASE ORAL
Qty: 194 CAPSULE | Refills: 1 | Status: SHIPPED | OUTPATIENT
Start: 2021-04-13 | End: 2023-08-24

## 2021-04-13 RX ORDER — CYANOCOBALAMIN (VITAMIN B-12) 2500 MCG
2500 TABLET, SUBLINGUAL SUBLINGUAL DAILY
Qty: 90 TABLET | Refills: 3 | Status: SHIPPED | OUTPATIENT
Start: 2021-04-13

## 2021-04-13 RX ORDER — CYCLOBENZAPRINE HCL 10 MG
TABLET ORAL
Qty: 90 TABLET | Refills: 1 | Status: SHIPPED | OUTPATIENT
Start: 2021-04-13 | End: 2022-05-18

## 2021-04-13 RX ORDER — GLIPIZIDE AND METFORMIN HCL 2.5; 5 MG/1; MG/1
2 TABLET, FILM COATED ORAL
Qty: 372 TABLET | Refills: 0 | Status: SHIPPED | OUTPATIENT
Start: 2021-04-13 | End: 2021-04-13

## 2021-04-13 RX ORDER — PRAVASTATIN SODIUM 40 MG
40 TABLET ORAL AT BEDTIME
Qty: 90 TABLET | Refills: 3 | Status: SHIPPED | OUTPATIENT
Start: 2021-04-13 | End: 2023-08-24

## 2021-04-13 RX ORDER — NAPROXEN 500 MG/1
500 TABLET ORAL 2 TIMES DAILY WITH MEALS
Qty: 60 TABLET | Refills: 4 | Status: SHIPPED | OUTPATIENT
Start: 2021-04-13 | End: 2022-05-18

## 2021-04-13 RX ORDER — GLIPIZIDE AND METFORMIN HCL 2.5; 5 MG/1; MG/1
2 TABLET, FILM COATED ORAL
Qty: 372 TABLET | Refills: 3 | Status: SHIPPED | OUTPATIENT
Start: 2021-04-13 | End: 2022-05-18

## 2021-04-13 RX ADMIN — CYANOCOBALAMIN 1000 MCG: 1000 INJECTION, SOLUTION INTRAMUSCULAR at 12:30

## 2021-04-13 ASSESSMENT — ENCOUNTER SYMPTOMS
NUMBNESS: 1
FATIGUE: 1
CHEST TIGHTNESS: 0
WOUND: 0
HEMATURIA: 0
BACK PAIN: 0
CHILLS: 0
BRUISES/BLEEDS EASILY: 0
ARTHRALGIAS: 1
FEVER: 0
CONFUSION: 0
ADENOPATHY: 0
SHORTNESS OF BREATH: 0
ABDOMINAL PAIN: 0

## 2021-04-13 ASSESSMENT — PAIN SCALES - GENERAL: PAINLEVEL: NO PAIN (0)

## 2021-04-13 ASSESSMENT — MIFFLIN-ST. JEOR: SCORE: 1914.22

## 2021-04-13 NOTE — NURSING NOTE
Chief Complaint   Patient presents with     Diabetes     Diabetes and Medications     States gabapentin is not helping like planned   Gina Cooper LPN........................4/13/2021  11:41 AM    Medication Reconciliation: completed   Gina Cooper LPN  4/13/2021 11:41 AM     Clinic Administered Medication Documentation      Injectable Medication Documentation    Patient was given Cyanocobalamin (B-12). Prior to medication administration, verified patients identity using patient s name and date of birth. Please see MAR and medication order for additional information. Patient instructed to remain in clinic for 15 minutes, report any adverse reaction to staff immediately  and stay in clinic after the injection but patient declined.      Was entire vial of medication used? Yes  Vial/Syringe: Single dose vial  Expiration Date:  2022  Was this medication supplied by the patient? No   Gina Cooper LPN........................4/13/2021  1:00 PM

## 2021-04-13 NOTE — PATIENT INSTRUCTIONS
Immunization History   Administered Date(s) Administered     TDAP Vaccine (Boostrix) 05/31/2016      -- Consider Annual Flu / Influenza vaccination - Every Fall (Starting about October 1st)    Schedule nurse only appointment for   -- Pneumococcal shot.   -- Hepatitis B series of shots.     B12 shot today... if very helpful, we can do these again... every 1 or 2 or 4 weeks... or every every 3 months if needed.     Start lowest effective dose of Cymbalta... to help with chronic pain.   -- start with 30 mg in the evening... increase if needed.     Aspects of Diabetes:   Recent Labs   Lab Test 04/13/21  1055 06/05/20  0838 06/04/18  1403   A1C 6.6* 6.5* 6.7*   LDL 85 108* 92   HDL 41 51 47   TRIG 257* 140 174*   ALT 67* 63* 54*   CR 0.71 0.70 0.84   GFRESTIMATED >90 >90 >90   GFRESTBLACK >90 >90 >90   POTASSIUM 4.3 4.8 3.8      Hemoglobin A1c  Goal range is under 8%. Best is 6.5 to 7   Blood Pressure 136/80 Goal to keep less than 140/90   Tobacco  reports that he quit smoking about 3 years ago. He has a 1.50 pack-year smoking history. He has never used smokeless tobacco. Goal to abstain from tobacco   Aspirin or Plavix Anti-platelet therapy Aspirin or Plavix reduces risk of heart disease and stroke  -- sometimes used with other blood thinners, depending on bleeding risk and risk factors.    ACE/ARB Specific blood pressure meds These medications can reduce risk of kidney disease   Cholesterol Statins (Lipitor, Crestor, vs others) Statins reduce risk of heart disease and stroke   Eye Exam -- Do Yearly -- Annual diabetic eye exam   Healthy weight Wt Readings from Last 3 Encounters:   04/13/21 98.9 kg (218 lb)   09/29/20 99.8 kg (220 lb 1.6 oz)   09/21/20 98.3 kg (216 lb 12.8 oz)     Body mass index is 32.19 kg/m .  Goal BMI under 30, best is under 25.      -- Trying to exercise daily (goal at least 20 min/day) with moderate aerobic activity   -- Eat healthy (resources from ADA at http://www.diabetes.org/)   -- Taking good  care of my feet. Consider seeing the Podiatrist   -- Check blood sugars as directed, record in log book and bring to every appointment    Insurance companies are grading you and I on your blood sugar control -- Goal is to get your A1c down to 7.9% or lower and NO Smoking!  -- Medicare and most insurance companies, will only cover Hemoglobin A1c labs to be rechecked every 91+ days.      Return for Diabetes labs and clinic follow-up appointment every 3 to 4 months.    Schedule lab only appointment --- A few days AFTER: 7/12/21   Schedule clinic appointment with Dr. Kunz -- Same day as labs, or 1-2 days later.

## 2021-04-13 NOTE — LETTER
April 13, 2021      Sanjay BETANCOURT Hoskins  [address] [address]        Dear ,    We are writing to inform you of your test results.    Vitamin B12 actually went down rather than up despite the 1000 mcg B12 supplement.  -- Start vitamin B12 supplement 2500 mcg, dissolvable tablet under the tongue once daily.    Vitamin D is still low.    -- Start 4000 IU or 100 mcg of vitamin D3 daily.    Triglycerides are little high.    Hemoglobin A1c is well controlled.  Diabetes is well controlled.    Plan to recheck labs again in 3 to 4 months.     All Kunz MD      Results for orders placed or performed in visit on 04/13/21   Vitamin B12     Status: None   Result Value Ref Range    Vitamin B12 604 180 - 914 pg/mL   Vitamin D Total     Status: None   Result Value Ref Range    Vitamin D Total 20.6 ng/mL   Results for orders placed or performed in visit on 04/13/21   TSH Reflex GH     Status: None   Result Value Ref Range    TSH Reflex 2.56 0.34 - 5.60 IU/mL   Lipid Profile     Status: Abnormal   Result Value Ref Range    Cholesterol 177 <200 mg/dL    Triglycerides 257 (H) <150 mg/dL    HDL Cholesterol 41 23 - 92 mg/dL    LDL Cholesterol Calculated 85 <100 mg/dL    Non HDL Cholesterol 136 (H) <130 mg/dL   Comprehensive metabolic panel     Status: Abnormal   Result Value Ref Range    Sodium 138 134 - 144 mmol/L    Potassium 4.3 3.5 - 5.1 mmol/L    Chloride 103 98 - 107 mmol/L    Carbon Dioxide 25 21 - 31 mmol/L    Anion Gap 10 3 - 14 mmol/L    Glucose 120 (H) 70 - 105 mg/dL    Urea Nitrogen 11 7 - 25 mg/dL    Creatinine 0.71 0.70 - 1.30 mg/dL    GFR Estimate >90 >60 mL/min/[1.73_m2]    GFR Estimate If Black >90 >60 mL/min/[1.73_m2]    Calcium 9.2 8.6 - 10.3 mg/dL    Bilirubin Total 0.8 0.3 - 1.0 mg/dL    Albumin 4.8 3.5 - 5.7 g/dL    Protein Total 7.5 6.4 - 8.9 g/dL    Alkaline Phosphatase 55 34 - 104 U/L    ALT 67 (H) 7 - 52 U/L    AST 26 13 - 39 U/L   CBC with platelets     Status: None   Result Value  Ref Range    WBC 7.6 4.0 - 11.0 10e9/L    RBC Count 5.26 4.4 - 5.9 10e12/L    Hemoglobin 15.2 13.3 - 17.7 g/dL    Hematocrit 43.1 40.0 - 53.0 %    MCV 82 78 - 100 fl    MCH 28.9 26.5 - 33.0 pg    MCHC 35.3 31.5 - 36.5 g/dL    RDW 12.3 10.0 - 15.0 %    Platelet Count 228 150 - 450 10e9/L   Hemoglobin A1c     Status: Abnormal   Result Value Ref Range    Hemoglobin A1C 6.6 (H) 4.0 - 6.0 %   Albumin Random Urine Quantitative with Creat Ratio     Status: Abnormal   Result Value Ref Range    Creatinine Urine 107 mg/dL    Albumin Urine mg/L 40 mg/L    Albumin Urine mg/g Cr 37.38 (H) 0 - 17 mg/g Cr   *UA reflex to Microscopic     Status: Abnormal   Result Value Ref Range    Color Urine Yellow     Appearance Urine Clear     Glucose Urine Negative NEG^Negative mg/dL    Bilirubin Urine Negative NEG^Negative    Ketones Urine Trace (A) NEG^Negative mg/dL    Specific Gravity Urine >1.030 (H) 1.000 - 1.030    Blood Urine Negative NEG^Negative    pH Urine 6.0 5.0 - 9.0 pH    Protein Albumin Urine Negative NEG^Negative mg/dL    Urobilinogen Urine 0.2 0.2 - 1.0 EU/dL    Nitrite Urine Negative NEG^Negative    Leukocyte Esterase Urine Negative NEG^Negative    Source Midstream Urine       If you have any questions or concerns, please call the clinic at the number listed above.       Sincerely,      All Kunz MD

## 2021-04-13 NOTE — PROGRESS NOTES
"Verified patient's first and last name, and . Patient stated reason for visit today is to receive a pneumovax 23 injection. Patient denied any concerns with previous injections. Pneumovax 23 prepared and administered as ordered. Administration of medication documented in MAR (see MAR for further information regarding dose, lot #, NDC #, expiration date). Patient encouraged to wait in lobby for 15 minutes post-injection and notify staff immediately of any reaction.       Pt declined Hep B vaccination stating \"I wasn't told I would have to come back 2 more times to finish the series.\"    Ingrid Mendez RN  ....................  2021   2:58 PM        "

## 2021-04-13 NOTE — PROGRESS NOTES
Mr. Hoskins is a 35 year old male who presents to the clinic for evaluation of the following problems:      ICD-10-CM    1. Controlled type 2 diabetes mellitus with diabetic polyneuropathy, without long-term current use of insulin (H)  E11.42 blood glucose monitoring (ABIMAEL MICROLET) lancets     blood glucose (NO BRAND SPECIFIED) test strip     glipiZIDE-metFORMIN (METAGLIP) 2.5-500 MG tablet     DULoxetine (CYMBALTA) 30 MG capsule     DISCONTINUED: glipiZIDE-metFORMIN (METAGLIP) 2.5-500 MG tablet   2. Benign essential hypertension  I10 lisinopril (ZESTRIL) 5 MG tablet   3. Mixed hyperlipidemia  E78.2 pravastatin (PRAVACHOL) 40 MG tablet   4. History of neck pain  Z87.39 cyclobenzaprine (FLEXERIL) 10 MG tablet   5. Pain in joint involving ankle and foot, right  M25.571 naproxen (NAPROSYN) 500 MG tablet   6. Vitamin D deficiency  E55.9 Vitamin D Total     vitamin D3 (CHOLECALCIFEROL) 50 mcg (2000 units) tablet   7. Vitamin B12 deficiency  E53.8 cyanocobalamin injection 1,000 mcg     Vitamin B12     vitamin B-12 (CYANOCOBALAMIN) 2500 MCG sublingual tablet   8. Heartburn  R12 omeprazole (PRILOSEC) 20 MG DR capsule     HPI  Patient presents for follow-up.  He had lab work earlier today.  Still dealing with soft tissue pain, neuropathies.  Mostly having numbness and burning but occasionally more of a sharp stabbing pain in the feet.  Also having more problems with pain in his hands and fingers as well, the last few months.    Type 2 diabetes is now well controlled.  He has diabetic neuropathy.  Likely has some contribution of peripheral neuropathy due to B12 deficiency as well.  His vitamin B12 levels have gone down despite him reporting to take vitamin B-12 supplementation.  Increase B12 supplement up to 20,500 mcg under the tongue daily.  B12 shot today.    To help with neuropathy, start trial of Cymbalta 30 mg in the evening.  Do this for about 2 weeks if needed increase up to 60 mg in the evening.  Needs diabetic  medication refills.  Denies hypoglycemia.  Still noticing higher blood sugars in the morning at times.    Hypertension, blood pressure is currently well controlled.  Denies syncope or presyncope.  Kidney function/creatinine is stable.  No changes for now.    Mixed hyperlipidemia, currently on pravastatin 40 mg daily.  LDL cholesterol went from the 110 range down into the 80 range.  Continue current dosing.  Tolerating well without side effects.  Refill sent to pharmacy.    Neck pain, taking Flexeril a few times a week.  He would like refills.    Ankle joint and foot pain.  Still using naproxen on average 1 tablet daily occasionally twice daily.  Refill sent to pharmacy.    Vitamin D and B12 deficiency.  Labs are rechecked.  B12 extra went down despite oral replacement.  Vitamin D is still low at 20.  Increase up to 2500 mcg of B12 daily and vitamin D3 4000 IU daily.  New prescription sent to pharmacy.  Hopefully this will help with his fatigue malaise and neuropathy symptoms as well.    Heartburn and reflux issues.  Continues on omeprazole.  Needs refills.    Functional Capacity: > 4 METS.   Review of Systems   Constitutional: Positive for fatigue. Negative for chills and fever.   HENT: Negative for congestion.    Eyes: Negative for visual disturbance.   Respiratory: Negative for chest tightness and shortness of breath.    Cardiovascular: Negative for chest pain.   Gastrointestinal: Negative for abdominal pain.   Endocrine:        High glucose levels intermittently   Genitourinary: Negative for hematuria.   Musculoskeletal: Positive for arthralgias (some joint stiffness. feels like hand  is intermittently weak + left ankle pain, using naproxen twice daily as needed). Negative for back pain.   Skin: Negative for rash and wound.        Foot pre-ulcerative callouses   Neurological: Positive for numbness (with neuropathy burning/tingling or stabbing pain in feet, painful daily, now with bilateral hand  "pains/numbness). Negative for syncope.   Hematological: Negative for adenopathy. Does not bruise/bleed easily.   Psychiatric/Behavioral: Negative for confusion.        Reviewed and updated as needed this visit by Provider   Tobacco  Allergies  Meds  Problems  Med Hx  Surg Hx  Fam Hx          EXAM:   Vitals:    04/13/21 1145   BP: 136/80   BP Location: Right arm   Patient Position: Sitting   Cuff Size: Adult Large   Pulse: 97   Resp: 20   Temp: 98.9  F (37.2  C)   TempSrc: Tympanic   SpO2: 98%   Weight: 98.9 kg (218 lb)   Height: 1.753 m (5' 9\")       Current Pain Score: No Pain (0)     BP Readings from Last 3 Encounters:   04/13/21 136/80   09/29/20 130/86   09/21/20 134/80      Wt Readings from Last 3 Encounters:   04/13/21 98.9 kg (218 lb)   09/29/20 99.8 kg (220 lb 1.6 oz)   09/21/20 98.3 kg (216 lb 12.8 oz)      Estimated body mass index is 32.19 kg/m  as calculated from the following:    Height as of this encounter: 1.753 m (5' 9\").    Weight as of this encounter: 98.9 kg (218 lb).     Physical Exam  Constitutional:       General: He is not in acute distress.     Appearance: He is well-developed. He is not diaphoretic.   HENT:      Head: Normocephalic and atraumatic.   Eyes:      General: No scleral icterus.     Conjunctiva/sclera: Conjunctivae normal.   Neck:      Musculoskeletal: Neck supple.      Vascular: No carotid bruit.   Cardiovascular:      Rate and Rhythm: Normal rate and regular rhythm.      Pulses: Normal pulses.   Pulmonary:      Effort: Pulmonary effort is normal.      Breath sounds: Normal breath sounds.   Abdominal:      Palpations: Abdomen is soft.      Tenderness: There is no abdominal tenderness.   Musculoskeletal:         General: No deformity.      Right lower leg: No edema.      Left lower leg: No edema.   Lymphadenopathy:      Cervical: No cervical adenopathy.   Skin:     General: Skin is warm and dry.      Findings: No rash.   Neurological:      Mental Status: He is alert and " oriented to person, place, and time. Mental status is at baseline.   Psychiatric:         Mood and Affect: Mood normal.         Behavior: Behavior normal.          Procedures   INVESTIGATIONS:  - Labs reviewed in Epic     ASSESSMENT AND PLAN:  Problem List Items Addressed This Visit        Nervous and Auditory    Controlled type 2 diabetes mellitus with diabetic polyneuropathy, without long-term current use of insulin (H) - Primary    Relevant Medications    blood glucose monitoring (ABIMAEL MICROLET) lancets    blood glucose (NO BRAND SPECIFIED) test strip    cyclobenzaprine (FLEXERIL) 10 MG tablet    glipiZIDE-metFORMIN (METAGLIP) 2.5-500 MG tablet    DULoxetine (CYMBALTA) 30 MG capsule       Digestive    Heartburn    Relevant Medications    omeprazole (PRILOSEC) 20 MG DR capsule    Vitamin D deficiency    Relevant Medications    vitamin D3 (CHOLECALCIFEROL) 50 mcg (2000 units) tablet    Other Relevant Orders    Vitamin D Total (Completed)    Vitamin B12 deficiency    Relevant Medications    cyanocobalamin injection 1,000 mcg (Completed)    vitamin B-12 (CYANOCOBALAMIN) 2500 MCG sublingual tablet    Other Relevant Orders    Vitamin B12 (Completed)       Endocrine    Mixed hyperlipidemia    Relevant Medications    pravastatin (PRAVACHOL) 40 MG tablet       Circulatory    Benign essential hypertension    Relevant Medications    lisinopril (ZESTRIL) 5 MG tablet       Other    History of neck pain    Relevant Medications    cyclobenzaprine (FLEXERIL) 10 MG tablet      Other Visit Diagnoses     Pain in joint involving ankle and foot, right        Relevant Medications    cyclobenzaprine (FLEXERIL) 10 MG tablet    naproxen (NAPROSYN) 500 MG tablet        reviewed diet, exercise and weight control, recommended sodium restriction, cardiovascular risk and specific lipid/LDL goals reviewed, specific diabetic recommendations low cholesterol diet, weight control and daily exercise discussed, use of aspirin to prevent MI and  TIA's discussed  -- Expected clinical course discussed    -- Medications and their side effects discussed    Patient Instructions     Immunization History   Administered Date(s) Administered     TDAP Vaccine (Boostrix) 05/31/2016      -- Consider Annual Flu / Influenza vaccination - Every Fall (Starting about October 1st)    Schedule nurse only appointment for   -- Pneumococcal shot.   -- Hepatitis B series of shots.     B12 shot today... if very helpful, we can do these again... every 1 or 2 or 4 weeks... or every every 3 months if needed.     Start lowest effective dose of Cymbalta... to help with chronic pain.   -- start with 30 mg in the evening... increase if needed.     Aspects of Diabetes:   Recent Labs   Lab Test 04/13/21  1055 06/05/20  0838 06/04/18  1403   A1C 6.6* 6.5* 6.7*   LDL 85 108* 92   HDL 41 51 47   TRIG 257* 140 174*   ALT 67* 63* 54*   CR 0.71 0.70 0.84   GFRESTIMATED >90 >90 >90   GFRESTBLACK >90 >90 >90   POTASSIUM 4.3 4.8 3.8      Hemoglobin A1c  Goal range is under 8%. Best is 6.5 to 7   Blood Pressure 136/80 Goal to keep less than 140/90   Tobacco  reports that he quit smoking about 3 years ago. He has a 1.50 pack-year smoking history. He has never used smokeless tobacco. Goal to abstain from tobacco   Aspirin or Plavix Anti-platelet therapy Aspirin or Plavix reduces risk of heart disease and stroke  -- sometimes used with other blood thinners, depending on bleeding risk and risk factors.    ACE/ARB Specific blood pressure meds These medications can reduce risk of kidney disease   Cholesterol Statins (Lipitor, Crestor, vs others) Statins reduce risk of heart disease and stroke   Eye Exam -- Do Yearly -- Annual diabetic eye exam   Healthy weight Wt Readings from Last 3 Encounters:   04/13/21 98.9 kg (218 lb)   09/29/20 99.8 kg (220 lb 1.6 oz)   09/21/20 98.3 kg (216 lb 12.8 oz)     Body mass index is 32.19 kg/m .  Goal BMI under 30, best is under 25.      -- Trying to exercise daily (goal  at least 20 min/day) with moderate aerobic activity   -- Eat healthy (resources from ADA at http://www.diabetes.org/)   -- Taking good care of my feet. Consider seeing the Podiatrist   -- Check blood sugars as directed, record in log book and bring to every appointment    Insurance companies are grading you and I on your blood sugar control -- Goal is to get your A1c down to 7.9% or lower and NO Smoking!  -- Medicare and most insurance companies, will only cover Hemoglobin A1c labs to be rechecked every 91+ days.      Return for Diabetes labs and clinic follow-up appointment every 3 to 4 months.    Schedule lab only appointment --- A few days AFTER: 7/12/21   Schedule clinic appointment with Dr. Kunz -- Same day as labs, or 1-2 days later.      All Kunz MD   Internal Medicine  [facility]

## 2021-07-15 ENCOUNTER — TELEPHONE (OUTPATIENT)
Dept: LAB | Facility: OTHER | Age: 36
End: 2021-07-15

## 2021-07-15 DIAGNOSIS — E55.9 VITAMIN D DEFICIENCY: ICD-10-CM

## 2021-07-15 DIAGNOSIS — E11.42 CONTROLLED TYPE 2 DIABETES MELLITUS WITH DIABETIC POLYNEUROPATHY, WITHOUT LONG-TERM CURRENT USE OF INSULIN (H): ICD-10-CM

## 2021-07-15 DIAGNOSIS — E53.8 VITAMIN B12 DEFICIENCY: Primary | ICD-10-CM

## 2021-07-15 DIAGNOSIS — E78.2 MIXED HYPERLIPIDEMIA: ICD-10-CM

## 2021-08-19 ENCOUNTER — PATIENT OUTREACH (OUTPATIENT)
Dept: INTERNAL MEDICINE | Facility: OTHER | Age: 36
End: 2021-08-19

## 2021-08-19 NOTE — TELEPHONE ENCOUNTER
Patient Quality Outreach      Summary:    Patient has the following on his problem list/HM:   Diabetes    Last A1C:  Lab Results   Component Value Date    A1C 6.6 04/13/2021    A1C 6.5 06/05/2020       Last LDL:    Lab Results   Component Value Date    LDL 85 04/13/2021       Is the patient on a Statin? Yes          Is the patient on Aspirin? Yes    Medications     HMG CoA Reductase Inhibitors     pravastatin (PRAVACHOL) 40 MG tablet       Salicylates     aspirin EC 81 MG EC tablet             Last three blood pressure readings:  BP Readings from Last 3 Encounters:   04/13/21 136/80   09/29/20 130/86   09/21/20 134/80            Tobacco Use      Smoking status: Former Smoker        Packs/day: 0.25        Years: 6.00        Pack years: 1.5        Quit date: 4/4/2018        Years since quitting: 3.3      Smokeless tobacco: Never Used          Patient is due/failing the following:   Eye Exam    Type of outreach:    Sent [employer] message.    Questions for provider review:    None                                                                                                                                     Sarah Alexander LPN .............8/19/2021  4:08 PM       Chart routed to MARGARET.

## 2021-11-02 ENCOUNTER — ALLIED HEALTH/NURSE VISIT (OUTPATIENT)
Dept: FAMILY MEDICINE | Facility: OTHER | Age: 36
End: 2021-11-02
Attending: FAMILY MEDICINE
Payer: COMMERCIAL

## 2021-11-02 DIAGNOSIS — R68.83 CHILLS: ICD-10-CM

## 2021-11-02 DIAGNOSIS — Z20.822 EXPOSURE TO 2019 NOVEL CORONAVIRUS: ICD-10-CM

## 2021-11-02 DIAGNOSIS — R05.9 COUGH: Primary | ICD-10-CM

## 2021-11-02 DIAGNOSIS — Z20.822 COVID-19 RULED OUT: ICD-10-CM

## 2021-11-02 PROCEDURE — U0003 INFECTIOUS AGENT DETECTION BY NUCLEIC ACID (DNA OR RNA); SEVERE ACUTE RESPIRATORY SYNDROME CORONAVIRUS 2 (SARS-COV-2) (CORONAVIRUS DISEASE [COVID-19]), AMPLIFIED PROBE TECHNIQUE, MAKING USE OF HIGH THROUGHPUT TECHNOLOGIES AS DESCRIBED BY CMS-2020-01-R: HCPCS | Mod: ZL

## 2021-11-02 PROCEDURE — C9803 HOPD COVID-19 SPEC COLLECT: HCPCS

## 2021-11-03 LAB — SARS-COV-2 RNA RESP QL NAA+PROBE: POSITIVE

## 2021-12-04 ENCOUNTER — HEALTH MAINTENANCE LETTER (OUTPATIENT)
Age: 36
End: 2021-12-04

## 2021-12-20 DIAGNOSIS — R12 HEARTBURN: ICD-10-CM

## 2021-12-22 NOTE — TELEPHONE ENCOUNTER
[facility] sent Rx request for the following:      Requested Prescriptions   Pending Prescriptions Disp Refills     omeprazole (PRILOSEC) 20 MG DR capsule [Pharmacy Med Name: omeprazole 20 mg capsule,delayed release] 90 capsule 3     Sig: Take 1 capsule (20 mg) by mouth daily Before a meal       PPI Protocol Passed - 12/20/2021  9:39 AM      Last Prescription Date:   4/13/2021  Last Fill Qty/Refills:         90, R-3    Last Office Visit:              4/13/2021 Ze   Future Office visit:           None noted   Medication is being denied due to: Redundant refill request refused: Too soon:  Kathy Steele RN ....................  12/22/2021   8:38 AM

## 2021-12-31 DIAGNOSIS — R12 HEARTBURN: ICD-10-CM

## 2022-01-04 NOTE — TELEPHONE ENCOUNTER
[facility] sent Rx request for the following:   Requested Prescriptions   Pending Prescriptions Disp Refills     omeprazole (PRILOSEC) 20 MG DR capsule [Pharmacy Med Name: omeprazole 20 mg capsule,delayed release] 90 capsule 3     Sig: Take 1 capsule (20 mg) by mouth daily Before a meal       PPI Protocol Passed - 12/31/2021 10:11 AM      Last Prescription Date:   4/13/2021  Last Fill Qty/Refills:         90, R-3    Last Office Visit:              4/13/2021 Ze   Future Office visit:           None noted  Routing refill request to provider for review/approval because:  Medication is reported/historical  Unable to complete prescription refill per RN Medication Refill Policy.................... Kathy Steele RN ....................  1/4/2022   7:45 AM

## 2022-02-13 ENCOUNTER — OFFICE VISIT (OUTPATIENT)
Dept: FAMILY MEDICINE | Facility: OTHER | Age: 37
End: 2022-02-13
Attending: NURSE PRACTITIONER
Payer: COMMERCIAL

## 2022-02-13 VITALS
SYSTOLIC BLOOD PRESSURE: 138 MMHG | BODY MASS INDEX: 32 KG/M2 | OXYGEN SATURATION: 99 % | HEART RATE: 72 BPM | DIASTOLIC BLOOD PRESSURE: 84 MMHG | TEMPERATURE: 97.8 F | WEIGHT: 216.7 LBS | RESPIRATION RATE: 16 BRPM

## 2022-02-13 DIAGNOSIS — R60.0 SALIVARY GLAND SWELLING: Primary | ICD-10-CM

## 2022-02-13 PROCEDURE — 99213 OFFICE O/P EST LOW 20 MIN: CPT | Performed by: FAMILY MEDICINE

## 2022-02-13 RX ORDER — AZITHROMYCIN 250 MG/1
TABLET, FILM COATED ORAL
Qty: 6 TABLET | Refills: 0 | Status: SHIPPED | OUTPATIENT
Start: 2022-02-13 | End: 2022-02-18

## 2022-02-13 ASSESSMENT — PAIN SCALES - GENERAL: PAINLEVEL: MILD PAIN (3)

## 2022-02-13 NOTE — PROGRESS NOTES
(R60.0) Salivary gland swelling  (primary encounter diagnosis)  Comment:   Likely an obstructed salivary gland.  Did not appear particularly inflamed.  Plan: azithromycin (ZITHROMAX) 250 MG tablet        Fluids, lemon drops, observation.  Follow-up if not improving.      CHIEF COMPLAINT    Swelling below right jaw.      HISTORY    He has developed some swelling over the last 1 to 2 days.  Location is below right mandible.  Mildly tender.  Not having fevers or chills.  No sore throat or congestion.    He did have a little cold symptoms 3 or 4 days ago.  Tested negative for Covid.      REVIEW OF SYSTEMS    No fever.  No cough.  No headache.      EXAM  /84   Pulse 72   Temp 97.8  F (36.6  C) (Tympanic)   Resp 16   Wt 98.3 kg (216 lb 11.2 oz)   SpO2 99%   BMI 32.00 kg/m      On inspection there is some mild swelling below the right mandible.  Posterior pharynx appears normal.  Tonsils absent.  No inflammation.  Palpable moderate swelling in the area of right submandibular gland, about 4 cm diameter.  Minimal tenderness.  No cervical adenopathy.

## 2022-02-13 NOTE — NURSING NOTE
"Chief Complaint   Patient presents with     Throat Problem     Patient is here for a lump on the right side of his neck that was noticed yesterday. Patient states he took asprin but is unsure if it helped. Patient states he was sick Tuesday/wednesday but feels better from that and has had 2 negative home COVID tests since. Patient was COVID positive 11/2/2022.     Initial /84   Pulse 72   Temp 97.8  F (36.6  C) (Tympanic)   Resp 16   Wt 98.3 kg (216 lb 11.2 oz)   SpO2 99%   BMI 32.00 kg/m   Estimated body mass index is 32 kg/m  as calculated from the following:    Height as of 4/13/21: 1.753 m (5' 9\").    Weight as of this encounter: 98.3 kg (216 lb 11.2 oz).  Medication Reconciliation: complete    Whitney Buckner, LENI  "

## 2022-02-13 NOTE — PATIENT INSTRUCTIONS
Take prescribed medication as directed.    Keep up fluids.    Lemon Drops.    Follow up appt if not getting better.

## 2022-02-17 PROBLEM — K21.9 GASTROESOPHAGEAL REFLUX DISEASE: Status: ACTIVE | Noted: 2018-06-04

## 2022-06-10 DIAGNOSIS — E11.42 CONTROLLED TYPE 2 DIABETES MELLITUS WITH DIABETIC POLYNEUROPATHY, WITHOUT LONG-TERM CURRENT USE OF INSULIN (H): ICD-10-CM

## 2022-06-10 NOTE — TELEPHONE ENCOUNTER
Called patient to reschedule his appointment today with WILLIAM as she is out of office. He is now rescheduled for 6/24/22 and needs medication Metformin to get him through to this appointment.     Reason for call: Medication or medication refill    Name of medication requested: Metformin    Are you out of the medication? No, about a week's worth left    What pharmacy do you use? [facility]    Preferred method for responding to this message: Telephone Call    Phone number patient can be reached at: Cell number on file:    Telephone Information:   Mobile [phone]       If we cannot reach you directly, may we leave a detailed response at the number you provided? Yes    Kayla James on 6/10/2022 at 8:55 AM

## 2022-06-13 RX ORDER — GLIPIZIDE AND METFORMIN HCL 2.5; 5 MG/1; MG/1
2 TABLET, FILM COATED ORAL
Qty: 372 TABLET | Refills: 4 | Status: SHIPPED | OUTPATIENT
Start: 2022-06-13 | End: 2023-08-04

## 2022-06-24 ENCOUNTER — OFFICE VISIT (OUTPATIENT)
Dept: FAMILY MEDICINE | Facility: OTHER | Age: 37
End: 2022-06-24
Attending: STUDENT IN AN ORGANIZED HEALTH CARE EDUCATION/TRAINING PROGRAM

## 2022-06-24 VITALS
HEART RATE: 76 BPM | HEIGHT: 69 IN | TEMPERATURE: 98.2 F | RESPIRATION RATE: 18 BRPM | DIASTOLIC BLOOD PRESSURE: 86 MMHG | OXYGEN SATURATION: 98 % | WEIGHT: 200.5 LBS | SYSTOLIC BLOOD PRESSURE: 138 MMHG | BODY MASS INDEX: 29.7 KG/M2

## 2022-06-24 DIAGNOSIS — E11.42 CONTROLLED TYPE 2 DIABETES MELLITUS WITH DIABETIC POLYNEUROPATHY, WITHOUT LONG-TERM CURRENT USE OF INSULIN (H): Primary | ICD-10-CM

## 2022-06-24 DIAGNOSIS — M25.571 PAIN IN JOINT INVOLVING ANKLE AND FOOT, RIGHT: ICD-10-CM

## 2022-06-24 DIAGNOSIS — Z87.39 HISTORY OF NECK PAIN: ICD-10-CM

## 2022-06-24 DIAGNOSIS — I10 BENIGN ESSENTIAL HYPERTENSION: ICD-10-CM

## 2022-06-24 DIAGNOSIS — Z00.00 ROUTINE GENERAL MEDICAL EXAMINATION AT A HEALTH CARE FACILITY: ICD-10-CM

## 2022-06-24 LAB
ANION GAP SERPL CALCULATED.3IONS-SCNC: 6 MMOL/L (ref 3–14)
BUN SERPL-MCNC: 13 MG/DL (ref 7–25)
CALCIUM SERPL-MCNC: 9.8 MG/DL (ref 8.6–10.3)
CHLORIDE BLD-SCNC: 100 MMOL/L (ref 98–107)
CHOLEST SERPL-MCNC: 202 MG/DL
CO2 SERPL-SCNC: 30 MMOL/L (ref 21–31)
CREAT SERPL-MCNC: 0.8 MG/DL (ref 0.7–1.3)
FASTING STATUS PATIENT QL REPORTED: NO
GFR SERPL CREATININE-BSD FRML MDRD: >90 ML/MIN/1.73M2
GLUCOSE BLD-MCNC: 127 MG/DL (ref 70–105)
HBA1C MFR BLD: 6.8 % (ref 4–6.2)
HDLC SERPL-MCNC: 52 MG/DL (ref 23–92)
LDLC SERPL CALC-MCNC: 118 MG/DL
NONHDLC SERPL-MCNC: 150 MG/DL
POTASSIUM BLD-SCNC: 4.3 MMOL/L (ref 3.5–5.1)
SODIUM SERPL-SCNC: 136 MMOL/L (ref 134–144)
TRIGL SERPL-MCNC: 162 MG/DL

## 2022-06-24 PROCEDURE — 82310 ASSAY OF CALCIUM: CPT | Mod: ZL | Performed by: STUDENT IN AN ORGANIZED HEALTH CARE EDUCATION/TRAINING PROGRAM

## 2022-06-24 PROCEDURE — 99395 PREV VISIT EST AGE 18-39: CPT | Performed by: STUDENT IN AN ORGANIZED HEALTH CARE EDUCATION/TRAINING PROGRAM

## 2022-06-24 PROCEDURE — 36415 COLL VENOUS BLD VENIPUNCTURE: CPT | Mod: ZL | Performed by: STUDENT IN AN ORGANIZED HEALTH CARE EDUCATION/TRAINING PROGRAM

## 2022-06-24 PROCEDURE — 80061 LIPID PANEL: CPT | Mod: ZL | Performed by: STUDENT IN AN ORGANIZED HEALTH CARE EDUCATION/TRAINING PROGRAM

## 2022-06-24 PROCEDURE — 83036 HEMOGLOBIN GLYCOSYLATED A1C: CPT | Mod: ZL | Performed by: STUDENT IN AN ORGANIZED HEALTH CARE EDUCATION/TRAINING PROGRAM

## 2022-06-24 PROCEDURE — 99213 OFFICE O/P EST LOW 20 MIN: CPT | Mod: 25 | Performed by: STUDENT IN AN ORGANIZED HEALTH CARE EDUCATION/TRAINING PROGRAM

## 2022-06-24 RX ORDER — NAPROXEN 500 MG/1
500 TABLET ORAL 2 TIMES DAILY WITH MEALS
Qty: 14 TABLET | Refills: 3 | Status: SHIPPED | OUTPATIENT
Start: 2022-06-24 | End: 2023-09-07

## 2022-06-24 RX ORDER — CYCLOBENZAPRINE HCL 10 MG
TABLET ORAL
Qty: 7 TABLET | Refills: 3 | Status: SHIPPED | OUTPATIENT
Start: 2022-06-24 | End: 2023-08-02

## 2022-06-24 ASSESSMENT — ENCOUNTER SYMPTOMS
HEMATOCHEZIA: 0
ABDOMINAL PAIN: 0
SHORTNESS OF BREATH: 0
SORE THROAT: 0
COUGH: 0
CHILLS: 0
WEAKNESS: 0
NAUSEA: 0
NERVOUS/ANXIOUS: 0
FEVER: 0
CONSTIPATION: 0
DIARRHEA: 0
MYALGIAS: 0
DIZZINESS: 0
FREQUENCY: 0
DYSURIA: 0
HEMATURIA: 0
JOINT SWELLING: 0
HEARTBURN: 0
EYE PAIN: 0
PARESTHESIAS: 0
HEADACHES: 0
ARTHRALGIAS: 0
PALPITATIONS: 0

## 2022-06-24 ASSESSMENT — ANXIETY QUESTIONNAIRES
2. NOT BEING ABLE TO STOP OR CONTROL WORRYING: NOT AT ALL
7. FEELING AFRAID AS IF SOMETHING AWFUL MIGHT HAPPEN: NOT AT ALL
3. WORRYING TOO MUCH ABOUT DIFFERENT THINGS: NOT AT ALL
6. BECOMING EASILY ANNOYED OR IRRITABLE: NOT AT ALL
1. FEELING NERVOUS, ANXIOUS, OR ON EDGE: NOT AT ALL
GAD7 TOTAL SCORE: 0
GAD7 TOTAL SCORE: 0
7. FEELING AFRAID AS IF SOMETHING AWFUL MIGHT HAPPEN: NOT AT ALL
4. TROUBLE RELAXING: NOT AT ALL
GAD7 TOTAL SCORE: 0
8. IF YOU CHECKED OFF ANY PROBLEMS, HOW DIFFICULT HAVE THESE MADE IT FOR YOU TO DO YOUR WORK, TAKE CARE OF THINGS AT HOME, OR GET ALONG WITH OTHER PEOPLE?: NOT DIFFICULT AT ALL
5. BEING SO RESTLESS THAT IT IS HARD TO SIT STILL: NOT AT ALL

## 2022-06-24 ASSESSMENT — PATIENT HEALTH QUESTIONNAIRE - PHQ9
10. IF YOU CHECKED OFF ANY PROBLEMS, HOW DIFFICULT HAVE THESE PROBLEMS MADE IT FOR YOU TO DO YOUR WORK, TAKE CARE OF THINGS AT HOME, OR GET ALONG WITH OTHER PEOPLE: NOT DIFFICULT AT ALL
SUM OF ALL RESPONSES TO PHQ QUESTIONS 1-9: 0
SUM OF ALL RESPONSES TO PHQ QUESTIONS 1-9: 0

## 2022-06-24 ASSESSMENT — PAIN SCALES - GENERAL: PAINLEVEL: NO PAIN (0)

## 2022-06-24 NOTE — RESULT ENCOUNTER NOTE
Results discussed directly with patient while patient was present. Any further details documented in the note.   Richar Shirley MD Odomzo Counseling- I discussed with the patient the risks of Odomzo including but not limited to nausea, vomiting, diarrhea, constipation, weight loss, changes in the sense of taste, decreased appetite, muscle spasms, and hair loss.  The patient verbalized understanding of the proper use and possible adverse effects of Odomzo.  All of the patient's questions and concerns were addressed.

## 2022-06-24 NOTE — PROGRESS NOTES
3  SUBJECTIVE:   CC: Sanjay Hoskins is an 36 year old male who presents for preventive health visit.       Diabetes check-- takes a combo glipizide-metformin 5-1000 mg total daily.   BG checks: in the AM sometimes up to 180 sometimes around 80  At time misses lunch and can get low below 70   BG varies depending on diet   At times feels like he has neuropathy pains  Is due for eye exam and foot exam     Med refill- needs refill on flexeril and naproxen  Inconsistently takes lisinopril, he's not sure if it is a daily med or not    Today's PHQ-2 Score:   PHQ-2 (  [employer]) 2022   Q1: Little interest or pleasure in doing things 0 0   Q2: Feeling down, depressed or hopeless 0 0   PHQ-2 Score 0 0   PHQ-2 Total Score (12-17 Years)- Positive if 3 or more points; Administer PHQ-A if positive - -   Q1: Little interest or pleasure in doing things Not at all -   Q2: Feeling down, depressed or hopeless Not at all -   PHQ-2 Score 0 -       Abuse: Current or Past(Physical, Sexual or Emotional)- No  Do you feel safe in your environment? Yes    Have you ever done Advance Care Planning? (For example, a Health Directive, POLST, or a discussion with a medical provider or your loved ones about your wishes): No, advance care planning information given to patient to review.  Patient declined advance care planning discussion at this time.    Social History     Tobacco Use     Smoking status: Former Smoker     Packs/day: 0.25     Years: 6.00     Pack years: 1.50     Quit date: 2018     Years since quittin.2     Smokeless tobacco: Never Used   Substance Use Topics     Alcohol use: Yes     Alcohol/week: 6.0 standard drinks     Comment: 1-2 times on the weekends     If you drink alcohol do you typically have >3 drinks per day or >7 drinks per week? Yes - AUDIT SCORE:  3                        Last PSA: No results found for: PSA    Reviewed orders with patient. Reviewed health maintenance and updated orders accordingly -  Yes  Current Outpatient Medications   Medication Sig Dispense Refill     aspirin EC 81 MG EC tablet Take 1 tablet by mouth daily with food -- just a couple times weekly --       blood glucose (CONTOUR NEXT TEST) test strip Use to test blood sugar 3 times daily 300 strip 0     blood glucose monitoring (ABIMAEL MICROLET) lancets Use to test blood sugar 3 times daily. 200 each prn     blood glucose monitoring (CONTOUR NEXT MONITOR W/DEVICE KIT) meter device kit Use to test blood sugar 3 times daily 1 kit 0     cyclobenzaprine (FLEXERIL) 10 MG tablet Take 1 tablet by mouth 3 times daily if needed for Muscle Spasm - overdue for labs and physical 7 tablet 3     glipiZIDE-metFORMIN (METAGLIP) 2.5-500 MG tablet Take 2 tablets by mouth 2 times daily (before meals) - overdue for labs and physical 372 tablet 4     Ibuprofen (ADVIL PO) Take 800 mg by mouth       lisinopril (ZESTRIL) 5 MG tablet Take 1 tablet (5 mg) by mouth daily 90 tablet 3     naproxen (NAPROSYN) 500 MG tablet Take 1 tablet (500 mg) by mouth 2 times daily (with meals) 14 tablet 3     omeprazole (PRILOSEC) 20 MG DR capsule Take 1 capsule (20 mg) by mouth daily Before a meal 90 capsule 3     order for DME Equipment being ordered: Diabetic Custom Shoe 1 each 0     pravastatin (PRAVACHOL) 40 MG tablet Take 1 tablet (40 mg) by mouth At Bedtime For Cholesterol and Heart Attack Risk Reduction 90 tablet 3     vitamin B-12 (CYANOCOBALAMIN) 2500 MCG sublingual tablet Take 1 tablet (2,500 mcg) by mouth daily For Low B12 and Neuropathy -- Dose Increase 4/13/2021 90 tablet 3     vitamin D3 (CHOLECALCIFEROL) 50 mcg (2000 units) tablet Take 2 tablets (100 mcg) by mouth daily - for Vitamin D Deficiency + muscle pain 180 tablet 3     DULoxetine (CYMBALTA) 30 MG capsule Take 1 capsule (30 mg) by mouth every evening for 14 days, THEN 2 capsules (60 mg) every evening. -- use lowest effective dose, for diabetes pain (Patient not taking: Reported on 2/13/2022) 194 capsule 1  "      Reviewed and updated as needed this visit by clinical staff   Tobacco  Allergies  Meds   Med Hx  Surg Hx  Fam Hx  Soc Hx          Reviewed and updated as needed this visit by Provider                   Past Medical History:   Diagnosis Date     Hyperlipidemia     9/22/2015     Personal history of nicotine dependence     9/2/2015      History reviewed. No pertinent surgical history.    ROS:  CONSTITUTIONAL: NEGATIVE for fever, chills, change in weight  INTEGUMENTARY/SKIN: NEGATIVE for worrisome rashes, moles or lesions  EYES: NEGATIVE for vision changes or irritation  ENT: NEGATIVE for ear, mouth and throat problems  RESP: NEGATIVE for significant cough or SOB  CV: NEGATIVE for chest pain, palpitations or peripheral edema  GI: NEGATIVE for nausea, abdominal pain, heartburn, or change in bowel habits   male: negative for dysuria, hematuria, decreased urinary stream, erectile dysfunction, urethral discharge  MUSCULOSKELETAL: NEGATIVE for significant arthralgias or myalgia  NEURO: NEGATIVE for weakness, dizziness or paresthesias  PSYCHIATRIC: NEGATIVE for changes in mood or affect    OBJECTIVE:   /86 (BP Location: Left arm, Patient Position: Sitting, Cuff Size: Adult Regular)   Pulse 76   Temp 98.2  F (36.8  C) (Tympanic)   Resp 18   Ht 1.753 m (5' 9\")   Wt 90.9 kg (200 lb 8 oz)   SpO2 98%   BMI 29.61 kg/m    EXAM:  GENERAL: healthy, alert and no distress  EYES: Eyes grossly normal to inspection, PERRL and conjunctivae and sclerae normal  HENT: ear canals and TM's normal, nose and mouth without ulcers or lesions  NECK: no adenopathy, no asymmetry, masses, or scars and thyroid normal to palpation  RESP: lungs clear to auscultation - no rales, rhonchi or wheezes  CV: regular rate and rhythm, normal S1 S2, no S3 or S4, no murmur, click or rub, no peripheral edema and peripheral pulses strong  MS: no gross musculoskeletal defects noted, no edema  NEURO: Normal strength and tone, mentation intact " and speech normal  PSYCH: mentation appears normal, affect normal/bright  Diabetic foot exam: normal DP and PT pulses, no trophic changes or ulcerative lesions and normal sensory exam    Results for orders placed or performed in visit on 06/24/22 (from the past 24 hour(s))   Hemoglobin A1c   Result Value Ref Range    Hemoglobin A1C 6.8 (H) 4.0 - 6.2 %   Lipid Panel   Result Value Ref Range    Cholesterol 202 (H) <200 mg/dL    Triglycerides 162 (H) <150 mg/dL    Direct Measure HDL 52 23 - 92 mg/dL    LDL Cholesterol Calculated 118 (H) <=100 mg/dL    Non HDL Cholesterol 150 (H) <130 mg/dL    Patient Fasting > 8hrs? No     Narrative    Cholesterol  Desirable:  <200 mg/dL    Triglycerides  Normal:  Less than 150 mg/dL  Borderline High:  150-199 mg/dL  High:  200-499 mg/dL  Very High:  Greater than or equal to 500 mg/dL    Direct Measure HDL  Female:  Greater than or equal to 50 mg/dL   Male:  Greater than or equal to 40 mg/dL    LDL Cholesterol  Desirable:  <100mg/dL  Above Desirable:  100-129 mg/dL   Borderline High:  130-159 mg/dL   High:  160-189 mg/dL   Very High:  >= 190 mg/dL    Non HDL Cholesterol  Desirable:  130 mg/dL  Above Desirable:  130-159 mg/dL  Borderline High:  160-189 mg/dL  High:  190-219 mg/dL  Very High:  Greater than or equal to 220 mg/dL   Basic Metabolic Panel   Result Value Ref Range    Sodium 136 134 - 144 mmol/L    Potassium 4.3 3.5 - 5.1 mmol/L    Chloride 100 98 - 107 mmol/L    Carbon Dioxide (CO2) 30 21 - 31 mmol/L    Anion Gap 6 3 - 14 mmol/L    Urea Nitrogen 13 7 - 25 mg/dL    Creatinine 0.80 0.70 - 1.30 mg/dL    Calcium 9.8 8.6 - 10.3 mg/dL    Glucose 127 (H) 70 - 105 mg/dL    GFR Estimate >90 >60 mL/min/1.73m2       ASSESSMENT/PLAN:   Sanjay was seen today for physical.    Diagnoses and all orders for this visit:    Routine general medical examination at a health care facility  Up to date on age related cancer screening. Discussed diet and exercise     Controlled type 2 diabetes  "mellitus with diabetic polyneuropathy, without long-term current use of insulin (H)  A1c today 6.8. will leave medication doses as is for now as he is having periods of hypoglycemia in addition to hyperglycemia. I think related to poor diet. Continue statin for now.   -     Hemoglobin A1c; Future  -     Lipid Panel; Future  -     Basic Metabolic Panel; Future  -     Hemoglobin A1c  -     Lipid Panel  -     Basic Metabolic Panel  -     blood glucose (CONTOUR NEXT TEST) test strip; Use to test blood sugar 3 times daily    Pain in joint involving ankle and foot, right  Needs refill   -     naproxen (NAPROSYN) 500 MG tablet; Take 1 tablet (500 mg) by mouth 2 times daily (with meals)    History of neck pain  Needs refill  -     cyclobenzaprine (FLEXERIL) 10 MG tablet; Take 1 tablet by mouth 3 times daily if needed for Muscle Spasm - overdue for labs and physical    Benign essential hypertension  /86, advised to take lisinopril daily. Discussion about HTn and risk of uncontrolled HTN        Patient has been advised of split billing requirements and indicates understanding: Yes  COUNSELING:  Reviewed preventive health counseling, as reflected in patient instructions       Regular exercise       Healthy diet/nutrition       Vision screening       Alcohol Use     Estimated body mass index is 29.61 kg/m  as calculated from the following:    Height as of this encounter: 1.753 m (5' 9\").    Weight as of this encounter: 90.9 kg (200 lb 8 oz).    Weight management plan: Discussed healthy diet and exercise guidelines    He reports that he quit smoking about 4 years ago. He has a 1.50 pack-year smoking history. He has never used smokeless tobacco.      Counseling Resources:  ATP IV Guidelines  Pooled Cohorts Equation Calculator  FRAX Risk Assessment  ICSI Preventive Guidelines  Dietary Guidelines for Americans, 2010  USDA's MyPlate  ASA Prophylaxis  Lung CA Screening    Richar Shirley MD  [facility] " [facility]  Answers for HPI/ROS submitted by the patient on 6/24/2022  If you checked off any problems, how difficult have these problems made it for you to do your work, take care of things at home, or get along with other people?: Not difficult at all  PHQ9 TOTAL SCORE: 0  HUSSEIN 7 TOTAL SCORE: 0  Frequency of exercise:: None  Getting at least 3 servings of Calcium per day:: Yes  Diet:: Regular (no restrictions)  Taking medications regularly:: Yes  Medication side effects:: None  Bi-annual eye exam:: Yes  Dental care twice a year:: NO  Sleep apnea or symptoms of sleep apnea:: None  abdominal pain: No  Blood in stool: No  Blood in urine: No  chest pain: No  chills: No  congestion: No  constipation: No  cough: No  diarrhea: No  dizziness: No  ear pain: No  eye pain: No  nervous/anxious: No  fever: No  frequency: No  genital sores: No  headaches: No  hearing loss: No  heartburn: No  arthralgias: No  joint swelling: No  peripheral edema: No  mood changes: No  myalgias: No  nausea: No  dysuria: No  palpitations: No  Skin sensation changes: No  sore throat: No  urgency: No  rash: No  shortness of breath: No  visual disturbance: No  weakness: No  impotence: No  penile discharge: No  Additional concerns today:: No

## 2022-06-24 NOTE — NURSING NOTE
Patient presents to clinic for physical exam.  Medication Rec Complete  Lakshmi Pierre LPN............6/24/2022 2:54 PM

## 2022-09-17 ENCOUNTER — HEALTH MAINTENANCE LETTER (OUTPATIENT)
Age: 37
End: 2022-09-17

## 2022-11-16 ENCOUNTER — PATIENT OUTREACH (OUTPATIENT)
Dept: FAMILY MEDICINE | Facility: OTHER | Age: 37
End: 2022-11-16

## 2022-11-16 ENCOUNTER — MYC MEDICAL ADVICE (OUTPATIENT)
Dept: FAMILY MEDICINE | Facility: OTHER | Age: 37
End: 2022-11-16

## 2022-11-16 DIAGNOSIS — Z53.9 ERRONEOUS ENCOUNTER--DISREGARD: Primary | ICD-10-CM

## 2022-11-16 NOTE — TELEPHONE ENCOUNTER
Patient Quality Outreach    Patient is due for the following:   Diabetes -  Eye Exam    Next Steps:   No follow up needed at this time.    Type of outreach:    Sent [employer] message.      Questions for provider review:    None     Sylvia Woods

## 2023-01-23 ENCOUNTER — HEALTH MAINTENANCE LETTER (OUTPATIENT)
Age: 38
End: 2023-01-23

## 2023-07-30 ENCOUNTER — HEALTH MAINTENANCE LETTER (OUTPATIENT)
Age: 38
End: 2023-07-30

## 2023-08-02 DIAGNOSIS — E11.42 CONTROLLED TYPE 2 DIABETES MELLITUS WITH DIABETIC POLYNEUROPATHY, WITHOUT LONG-TERM CURRENT USE OF INSULIN (H): ICD-10-CM

## 2023-08-02 DIAGNOSIS — Z87.39 HISTORY OF NECK PAIN: ICD-10-CM

## 2023-08-02 DIAGNOSIS — R12 HEARTBURN: ICD-10-CM

## 2023-08-02 NOTE — TELEPHONE ENCOUNTER
"[facility] -  sent Rx request for the following:      Requested Prescriptions   Pending Prescriptions Disp Refills    glipiZIDE-metFORMIN (METAGLIP) 2.5-500 MG tablet [Pharmacy Med Name: glipizide 2.5 mg-metformin 500 mg tablet] 372 tablet 4     Sig: TAKE 2 TABLETS BY MOUTH 2 TIMES DAILY (BEFORE MEALS)       Combination Oral Antihyperglycemic Agents Failed - 8/2/2023 10:56 AM        Failed - Patient has documented A1c within the specified period of time.     If HgbA1C is 8 or greater, it needs to be on file within the past 3 months.  If less than 8, must be on file within the past 6 months.     Recent Labs   Lab Test 06/24/22  1445 06/04/18  1403 09/07/17  1557   A1C 6.8*   < >  --    [id number]  --   --  5.6    < > = values in this interval not displayed.           Failed - Patient's CR is NOT>1.4 OR Patient's EGFR is NOT<45 within past 12 mos.     Recent Labs   Lab Test 06/24/22  1445 04/13/21  1055   GFRESTIMATED >90 >90   GFRESTBLACK  --  >90     Recent Labs   Lab Test 06/24/22  1445   CR 0.80           Failed - Recent (6 mo) or future (30 days) visit within the authorizing provider's specialty     Patient had office visit in the last 6 months or has a visit in the next 30 days with authorizing provider or within the authorizing provider's specialty.  See \"Patient Info\" tab in inbasket, or \"Choose Columns\" in Meds & Orders section of the refill encounter.         Last Prescription Date:   06/13/22  Last Fill Qty/Refills:         372, R-4  Last Office Visit:              06/24/22   Future Office visit:           None    Patient due to be seen for visit.     Jenn Willard RN on 8/2/2023 at 11:57 AM      "

## 2023-08-02 NOTE — TELEPHONE ENCOUNTER
Overdue for Annual visit / physical with Dr. Mcgrath.       Please call patient and schedule.      All Kunz MD

## 2023-08-04 RX ORDER — GLIPIZIDE AND METFORMIN HCL 2.5; 5 MG/1; MG/1
TABLET, FILM COATED ORAL
Qty: 372 TABLET | Refills: 4 | Status: SHIPPED | OUTPATIENT
Start: 2023-08-04

## 2023-08-04 RX ORDER — CYCLOBENZAPRINE HCL 10 MG
TABLET ORAL
Qty: 7 TABLET | Refills: 3 | Status: SHIPPED | OUTPATIENT
Start: 2023-08-04 | End: 2023-09-07

## 2023-08-04 NOTE — TELEPHONE ENCOUNTER
Called and spoke to Patient after verifying last name and date of birth. Transferred patient to scheduling to get an annual visit set up with PCP.    Belgica Morel RN on 8/4/2023 at 10:40 AM

## 2023-08-24 ENCOUNTER — OFFICE VISIT (OUTPATIENT)
Dept: FAMILY MEDICINE | Facility: OTHER | Age: 38
End: 2023-08-24
Attending: STUDENT IN AN ORGANIZED HEALTH CARE EDUCATION/TRAINING PROGRAM

## 2023-08-24 VITALS
DIASTOLIC BLOOD PRESSURE: 94 MMHG | BODY MASS INDEX: 31.12 KG/M2 | OXYGEN SATURATION: 98 % | RESPIRATION RATE: 20 BRPM | HEIGHT: 69 IN | TEMPERATURE: 98.2 F | SYSTOLIC BLOOD PRESSURE: 148 MMHG | HEART RATE: 80 BPM | WEIGHT: 210.13 LBS

## 2023-08-24 DIAGNOSIS — I10 BENIGN ESSENTIAL HYPERTENSION: ICD-10-CM

## 2023-08-24 DIAGNOSIS — E11.42 CONTROLLED TYPE 2 DIABETES MELLITUS WITH DIABETIC POLYNEUROPATHY, WITHOUT LONG-TERM CURRENT USE OF INSULIN (H): ICD-10-CM

## 2023-08-24 DIAGNOSIS — Z00.00 ROUTINE GENERAL MEDICAL EXAMINATION AT A HEALTH CARE FACILITY: Primary | ICD-10-CM

## 2023-08-24 DIAGNOSIS — E78.2 MIXED HYPERLIPIDEMIA: ICD-10-CM

## 2023-08-24 LAB
ANION GAP SERPL CALCULATED.3IONS-SCNC: 12 MMOL/L (ref 7–15)
BUN SERPL-MCNC: 10.4 MG/DL (ref 6–20)
CALCIUM SERPL-MCNC: 9.3 MG/DL (ref 8.6–10)
CHLORIDE SERPL-SCNC: 100 MMOL/L (ref 98–107)
CHOLEST SERPL-MCNC: 189 MG/DL
CREAT SERPL-MCNC: 0.84 MG/DL (ref 0.67–1.17)
CREAT UR-MCNC: 167.3 MG/DL
DEPRECATED HCO3 PLAS-SCNC: 27 MMOL/L (ref 22–29)
GFR SERPL CREATININE-BSD FRML MDRD: >90 ML/MIN/1.73M2
GLUCOSE SERPL-MCNC: 144 MG/DL (ref 70–99)
HBA1C MFR BLD: 7 % (ref 4–6.2)
HDLC SERPL-MCNC: 46 MG/DL
LDLC SERPL CALC-MCNC: 92 MG/DL
MICROALBUMIN UR-MCNC: 17.5 MG/L
MICROALBUMIN/CREAT UR: 10.46 MG/G CR (ref 0–17)
NONHDLC SERPL-MCNC: 143 MG/DL
POTASSIUM SERPL-SCNC: 4.1 MMOL/L (ref 3.4–5.3)
SODIUM SERPL-SCNC: 139 MMOL/L (ref 136–145)
TRIGL SERPL-MCNC: 257 MG/DL

## 2023-08-24 PROCEDURE — 99395 PREV VISIT EST AGE 18-39: CPT | Performed by: STUDENT IN AN ORGANIZED HEALTH CARE EDUCATION/TRAINING PROGRAM

## 2023-08-24 PROCEDURE — 83036 HEMOGLOBIN GLYCOSYLATED A1C: CPT | Mod: ZL | Performed by: STUDENT IN AN ORGANIZED HEALTH CARE EDUCATION/TRAINING PROGRAM

## 2023-08-24 PROCEDURE — 36415 COLL VENOUS BLD VENIPUNCTURE: CPT | Mod: ZL | Performed by: STUDENT IN AN ORGANIZED HEALTH CARE EDUCATION/TRAINING PROGRAM

## 2023-08-24 PROCEDURE — 82310 ASSAY OF CALCIUM: CPT | Mod: ZL | Performed by: STUDENT IN AN ORGANIZED HEALTH CARE EDUCATION/TRAINING PROGRAM

## 2023-08-24 PROCEDURE — 99207 PR FOOT EXAM NO CHARGE: CPT | Mod: 25 | Performed by: STUDENT IN AN ORGANIZED HEALTH CARE EDUCATION/TRAINING PROGRAM

## 2023-08-24 PROCEDURE — 82570 ASSAY OF URINE CREATININE: CPT | Mod: ZL | Performed by: STUDENT IN AN ORGANIZED HEALTH CARE EDUCATION/TRAINING PROGRAM

## 2023-08-24 PROCEDURE — 80061 LIPID PANEL: CPT | Mod: ZL | Performed by: STUDENT IN AN ORGANIZED HEALTH CARE EDUCATION/TRAINING PROGRAM

## 2023-08-24 PROCEDURE — 99214 OFFICE O/P EST MOD 30 MIN: CPT | Mod: 25 | Performed by: STUDENT IN AN ORGANIZED HEALTH CARE EDUCATION/TRAINING PROGRAM

## 2023-08-24 RX ORDER — LISINOPRIL 5 MG/1
5 TABLET ORAL DAILY
Qty: 90 TABLET | Refills: 3 | Status: SHIPPED | OUTPATIENT
Start: 2023-08-24

## 2023-08-24 RX ORDER — PRAVASTATIN SODIUM 40 MG
40 TABLET ORAL AT BEDTIME
Qty: 90 TABLET | Refills: 3 | Status: SHIPPED | OUTPATIENT
Start: 2023-08-24

## 2023-08-24 ASSESSMENT — ENCOUNTER SYMPTOMS
ABDOMINAL PAIN: 0
DIARRHEA: 0
NERVOUS/ANXIOUS: 0
JOINT SWELLING: 1
HEMATOCHEZIA: 0
DYSURIA: 0
WEAKNESS: 0
DIZZINESS: 0
PARESTHESIAS: 0
SHORTNESS OF BREATH: 1
PALPITATIONS: 0
EYE PAIN: 0
CHILLS: 0
FREQUENCY: 0
MYALGIAS: 1
COUGH: 0
HEARTBURN: 0
FEVER: 0
HEADACHES: 1
CONSTIPATION: 0
ARTHRALGIAS: 1
HEMATURIA: 0
SORE THROAT: 0
NAUSEA: 0

## 2023-08-24 ASSESSMENT — ANXIETY QUESTIONNAIRES
GAD7 TOTAL SCORE: 0
7. FEELING AFRAID AS IF SOMETHING AWFUL MIGHT HAPPEN: NOT AT ALL
2. NOT BEING ABLE TO STOP OR CONTROL WORRYING: NOT AT ALL
GAD7 TOTAL SCORE: 0
IF YOU CHECKED OFF ANY PROBLEMS ON THIS QUESTIONNAIRE, HOW DIFFICULT HAVE THESE PROBLEMS MADE IT FOR YOU TO DO YOUR WORK, TAKE CARE OF THINGS AT HOME, OR GET ALONG WITH OTHER PEOPLE: NOT DIFFICULT AT ALL
3. WORRYING TOO MUCH ABOUT DIFFERENT THINGS: NOT AT ALL
6. BECOMING EASILY ANNOYED OR IRRITABLE: NOT AT ALL
5. BEING SO RESTLESS THAT IT IS HARD TO SIT STILL: NOT AT ALL
1. FEELING NERVOUS, ANXIOUS, OR ON EDGE: NOT AT ALL
4. TROUBLE RELAXING: NOT AT ALL

## 2023-08-24 ASSESSMENT — PAIN SCALES - GENERAL: PAINLEVEL: NO PAIN (0)

## 2023-08-24 NOTE — NURSING NOTE
Patient presents to clinic for physical exam.    Lakshmi Pierre LPN............8/24/2023 3:20 PM

## 2023-08-24 NOTE — PROGRESS NOTES
SUBJECTIVE:   CC: Sanjay is an 37 year old who presents for preventative health visit.       Healthy Habits:     Getting at least 3 servings of Calcium per day:  Yes    Bi-annual eye exam:  Yes    Dental care twice a year:  Yes    Sleep apnea or symptoms of sleep apnea:  Daytime drowsiness    Diet:  Diabetic    Frequency of exercise:  None    Taking medications regularly:  Yes    Medication side effects:  None    Additional concerns today:  No    Has not been taking lisinopril   Due for diabetes   Med refill     Today's PHQ-2 Score:       2023     3:22 PM   PHQ-2 (  [employer])   Q1: Little interest or pleasure in doing things 0   Q2: Feeling down, depressed or hopeless 0   PHQ-2 Score 0   Q1: Little interest or pleasure in doing things Not at all   Q2: Feeling down, depressed or hopeless Not at all   PHQ-2 Score 0                       Social History     Tobacco Use    Smoking status: Former     Packs/day: 0.25     Years: 6.00     Pack years: 1.50     Types: Cigarettes     Quit date: 2018     Years since quittin.3    Smokeless tobacco: Never   Substance Use Topics    Alcohol use: Yes     Alcohol/week: 6.0 standard drinks of alcohol     Comment: 1-2 times on the weekends             2023     3:22 PM   Alcohol Use   Prescreen: >3 drinks/day or >7 drinks/week? Yes   AUDIT SCORE  3         2023     3:22 PM   AUDIT - Alcohol Use Disorders Identification Test - Reproduced from the World Health Organization Audit 2001 (Second Edition)   1.  How often do you have a drink containing alcohol? Monthly or less   2.  How many drinks containing alcohol do you have on a typical day when you are drinking? 3 or 4   3.  How often do you have five or more drinks on one occasion? Less than monthly   4.  How often during the last year have you found that you were not able to stop drinking once you had started? Never   5.  How often during the last year have you failed to do what was normally expected of you  because of drinking? Never   6.  How often during the last year have you needed a first drink in the morning to get yourself going after a heavy drinking session? Never   7.  How often during the last year have you had a feeling of guilt or remorse after drinking? Never   8.  How often during the last year have you been unable to remember what happened the night before because of your drinking? Never   9.  Have you or someone else been injured because of your drinking? No   10. Has a relative, friend, doctor or other health care worker been concerned about your drinking or suggested you cut down? No   TOTAL SCORE 3       Last PSA: No results found for: PSA    Reviewed orders with patient. Reviewed health maintenance and updated orders accordingly - Yes  Lab work is in process    Reviewed and updated as needed this visit by clinical staff   Tobacco  Allergies  Meds   Med Hx  Surg Hx  Fam Hx          Reviewed and updated as needed this visit by Provider     Meds   Med Hx  Surg Hx  Fam Hx         Past Medical History:   Diagnosis Date    Hyperlipidemia     9/22/2015    Personal history of nicotine dependence     9/2/2015      Past Surgical History:   Procedure Laterality Date    palate      reconstruction due to issues after tonsillectomy    TONSILLECTOMY         Review of Systems   Constitutional:  Negative for chills and fever.   HENT:  Negative for congestion, ear pain, hearing loss and sore throat.    Eyes:  Negative for pain and visual disturbance.   Respiratory:  Positive for shortness of breath. Negative for cough.    Cardiovascular:  Negative for chest pain, palpitations and peripheral edema.   Gastrointestinal:  Negative for abdominal pain, constipation, diarrhea, heartburn, hematochezia and nausea.   Genitourinary:  Negative for dysuria, frequency, genital sores, hematuria and urgency.   Musculoskeletal:  Positive for arthralgias, joint swelling and myalgias.   Skin:  Negative for rash.  "  Neurological:  Positive for headaches. Negative for dizziness, weakness and paresthesias.   Psychiatric/Behavioral:  Negative for mood changes. The patient is not nervous/anxious.      CONSTITUTIONAL: NEGATIVE for fever, chills, change in weight  INTEGUMENTARY/SKIN: NEGATIVE for worrisome rashes, moles or lesions  EYES: NEGATIVE for vision changes or irritation  ENT: NEGATIVE for ear, mouth and throat problems  RESP: NEGATIVE for significant cough or SOB  CV: NEGATIVE for chest pain, palpitations or peripheral edema  GI: NEGATIVE for nausea, abdominal pain, heartburn, or change in bowel habits   male: negative for dysuria, hematuria, decreased urinary stream, erectile dysfunction, urethral discharge  MUSCULOSKELETAL: NEGATIVE for significant arthralgias or myalgia  NEURO: NEGATIVE for weakness, dizziness or paresthesias  PSYCHIATRIC: NEGATIVE for changes in mood or affect    OBJECTIVE:   BP (!) 148/94 (BP Location: Right arm, Patient Position: Sitting, Cuff Size: Adult Large)   Pulse 80   Temp 98.2  F (36.8  C) (Tympanic)   Resp 20   Ht 1.746 m (5' 8.75\")   Wt 95.3 kg (210 lb 2 oz)   SpO2 98%   BMI 31.26 kg/m      Physical Exam  GENERAL: healthy, alert and no distress  EYES: Eyes grossly normal to inspection, PERRL and conjunctivae and sclerae normal  HENT: ear canals and TM's normal, nose and mouth without ulcers or lesions  RESP: lungs clear to auscultation - no rales, rhonchi or wheezes  CV: regular rate and rhythm, normal S1 S2, no S3 or S4, no murmur, click or rub, no peripheral edema and peripheral pulses strong  ABDOMEN: non distended  MS: no gross musculoskeletal defects noted, no edema  SKIN: no suspicious lesions or rashes  PSYCH: mentation appears normal, affect normal/bright  Diabetic foot exam: normal DP and PT pulses, no trophic changes or ulcerative lesions, and normal sensory exam    Diagnostic Test Results:  Labs reviewed in Epic    ASSESSMENT/PLAN:   Sanjay was seen today for " "physical.    Diagnoses and all orders for this visit:    Routine general medical examination at a health care facility    Controlled type 2 diabetes mellitus with diabetic polyneuropathy, without long-term current use of insulin (H)  Due for A1c check, only once had a low BG, otherwise have been 100-200s, 150-200s post prandial   -     Hemoglobin A1c; Future  -     Lipid Panel; Future  -     Basic Metabolic Panel; Future  -     Albumin Random Urine Quantitative with Creat Ratio; Future  -     FOOT EXAM  -     Hemoglobin A1c  -     Lipid Panel  -     Basic Metabolic Panel  -     Albumin Random Urine Quantitative with Creat Ratio    Benign essential hypertension  Not well controlled. Has not been taking his lisinopril -- refilled and advised to resume, decrease caffeine intake   -     lisinopril (ZESTRIL) 5 MG tablet; Take 1 tablet (5 mg) by mouth daily    Mixed hyperlipidemia   Has not been taking, advised to resume  -     pravastatin (PRAVACHOL) 40 MG tablet; Take 1 tablet (40 mg) by mouth At Bedtime For Cholesterol and Heart Attack Risk Reduction              COUNSELING:   Reviewed preventive health counseling, as reflected in patient instructions      BMI:   Estimated body mass index is 31.26 kg/m  as calculated from the following:    Height as of this encounter: 1.746 m (5' 8.75\").    Weight as of this encounter: 95.3 kg (210 lb 2 oz).         He reports that he quit smoking about 5 years ago. He has a 1.50 pack-year smoking history. He has never used smokeless tobacco.            Richar Shirley MD  [facility] AND HOSPITALAnswers submitted by the patient for this visit:  HUSSEIN-7 (Submitted on 8/24/2023)  HUSSEIN 7 TOTAL SCORE: 0    " intact

## 2023-09-07 DIAGNOSIS — M25.571 PAIN IN JOINT INVOLVING ANKLE AND FOOT, RIGHT: ICD-10-CM

## 2023-09-07 DIAGNOSIS — Z87.39 HISTORY OF NECK PAIN: ICD-10-CM

## 2023-09-07 RX ORDER — CYCLOBENZAPRINE HCL 10 MG
TABLET ORAL
Qty: 7 TABLET | Refills: 3 | Status: SHIPPED | OUTPATIENT
Start: 2023-09-07 | End: 2023-09-07

## 2023-09-07 RX ORDER — NAPROXEN 500 MG/1
500 TABLET ORAL 2 TIMES DAILY WITH MEALS
Qty: 60 TABLET | Refills: 0 | Status: SHIPPED | OUTPATIENT
Start: 2023-09-07 | End: 2024-01-24

## 2023-09-07 RX ORDER — CYCLOBENZAPRINE HCL 10 MG
TABLET ORAL
Qty: 30 TABLET | Refills: 0 | Status: SHIPPED | OUTPATIENT
Start: 2023-09-07 | End: 2024-01-24

## 2023-09-07 RX ORDER — NAPROXEN 500 MG/1
500 TABLET ORAL 2 TIMES DAILY WITH MEALS
Qty: 14 TABLET | Refills: 3 | Status: SHIPPED | OUTPATIENT
Start: 2023-09-07 | End: 2023-09-07

## 2023-09-07 NOTE — TELEPHONE ENCOUNTER
Requested Prescriptions   Pending Prescriptions Disp Refills    cyclobenzaprine (FLEXERIL) 10 MG tablet 7 tablet 3     Sig: Take 1 tablet by mouth 3 times daily if needed for Muscle Spasm - overdue for labs and physical   Last Prescription Date:   8/4/23  Last Fill Qty/Refills:         7, R-3        naproxen (NAPROSYN) 500 MG tablet 14 tablet 3     Sig: Take 1 tablet (500 mg) by mouth 2 times daily (with meals)   Last Prescription Date:   6/24/22  Last Fill Qty/Refills:         14, R-3      Last Office Visit:              8/24/23   Future Office visit:           None    Lakshmi Fierro RN .............. 9/7/2023  3:22 PM

## 2023-09-07 NOTE — TELEPHONE ENCOUNTER
"The SIG on the Rx we received for cyclobenzaprine stated \"overdue for labs and physical\" and was written for only a qty of 7 tabs. Pt was just seen by Dr. Mcgrath on 8/24/23. Sending Rx for full qty of 30 tabs per CPA. Sending Rx for 30 DS of naproxen per CPA.     Dillon Lomeli, PharmD  [facility]     "

## 2023-09-07 NOTE — TELEPHONE ENCOUNTER
Reason for call: Medication or medication refill    Name of medication requested: Muscle relaxer and Naproxin       How many days of medication do you have left? A couple days    What pharmacy do you use? [facility]     Preferred method for responding to this message: Telephone Call    Phone number patient can be reached at: Home number on file [phone] (home)    If we cannot reach you directly, may we leave a detailed response at the number you provided? Yes        Sujey Yu on 9/7/2023 at 3:12 PM

## 2024-01-19 DIAGNOSIS — Z87.39 HISTORY OF NECK PAIN: ICD-10-CM

## 2024-01-19 DIAGNOSIS — M25.571 PAIN IN JOINT INVOLVING ANKLE AND FOOT, RIGHT: ICD-10-CM

## 2024-01-23 NOTE — TELEPHONE ENCOUNTER
[facility] sent Rx request for the following:      Requested Prescriptions   Pending Prescriptions Disp Refills    cyclobenzaprine (FLEXERIL) 10 MG tablet [Pharmacy Med Name: cyclobenzaprine 10 mg tablet] 30 tablet 0     Sig: TAKE 1 TABLET BY MOUTH 3 TIMES DAILY AS NEEDED FOR MUSCLE SPASM   Last Prescription Date:   9/7/23  Last Fill Qty/Refills:         30, R-0      There is no refill protocol information for this order       naproxen (NAPROSYN) 500 MG tablet [Pharmacy Med Name: naproxen 500 mg tablet] 60 tablet 0     Sig: Take 1 tablet (500 mg) by mouth 2 times daily (with meals)   Last Prescription Date:   9/7/23  Last Fill Qty/Refills:         60, R-0      NSAID Medications Failed - 1/23/2024  4:40 PM        Failed - Blood pressure under 140/90 in past 12 months     BP Readings from Last 3 Encounters:   08/24/23 (!) 148/94   06/24/22 138/86   02/13/22 138/84           Failed - Normal ALT on file in past 12 months     Recent Labs   Lab Test 04/13/21  1055   ALT 67*           Failed - Normal AST on file in past 12 months     Recent Labs   Lab Test 04/13/21  1055   AST 26           Failed - Normal CBC on file in past 12 months     Recent Labs   Lab Test 04/13/21  1055   WBC 7.6   RBC 5.26   HGB 15.2   HCT 43.1              Failed - Always Fail Criteria - Chart Review Required     Validate Diagnosis. If the medication is requested for an acute flare of a chronic pain associated with a musculoskeletal or rheumatologic condition; okay to authorize if all other criteria are met. If not, then forward to provider for review.        Failed - Normal GFR on file in past 12 months     Recent Labs   Lab Test 08/24/23  1542 06/24/22  1445 04/13/21  1055   GFRESTIMATED >90   < > >90   GFRESTBLACK  --   --  >90    < > = values in this interval not displayed.        Last Office Visit:              8/24/23   Future Office visit:           None    Unable to complete prescription refill per RN Medication Refill  Policy.     Lakshmi Fierro RN .............. 1/23/2024  4:41 PM

## 2024-01-24 RX ORDER — NAPROXEN 500 MG/1
500 TABLET ORAL 2 TIMES DAILY WITH MEALS
Qty: 60 TABLET | Refills: 0 | Status: SHIPPED | OUTPATIENT
Start: 2024-01-24 | End: 2024-07-24

## 2024-01-24 RX ORDER — CYCLOBENZAPRINE HCL 10 MG
TABLET ORAL
Qty: 30 TABLET | Refills: 0 | Status: SHIPPED | OUTPATIENT
Start: 2024-01-24

## 2024-04-06 ENCOUNTER — OFFICE VISIT (OUTPATIENT)
Dept: FAMILY MEDICINE | Facility: OTHER | Age: 39
End: 2024-04-06
Attending: MARRIAGE & FAMILY THERAPIST
Payer: COMMERCIAL

## 2024-04-06 VITALS
HEART RATE: 75 BPM | DIASTOLIC BLOOD PRESSURE: 90 MMHG | BODY MASS INDEX: 31.07 KG/M2 | SYSTOLIC BLOOD PRESSURE: 142 MMHG | WEIGHT: 208.9 LBS | OXYGEN SATURATION: 98 % | TEMPERATURE: 98.6 F | RESPIRATION RATE: 16 BRPM

## 2024-04-06 DIAGNOSIS — B02.7 DISSEMINATED HERPES ZOSTER: Primary | ICD-10-CM

## 2024-04-06 PROCEDURE — 99213 OFFICE O/P EST LOW 20 MIN: CPT | Performed by: REGISTERED NURSE

## 2024-04-06 RX ORDER — VALACYCLOVIR HYDROCHLORIDE 1 G/1
1000 TABLET, FILM COATED ORAL 3 TIMES DAILY
Qty: 21 TABLET | Refills: 0 | Status: SHIPPED | OUTPATIENT
Start: 2024-04-06 | End: 2024-04-13

## 2024-04-06 ASSESSMENT — PAIN SCALES - GENERAL: PAINLEVEL: MODERATE PAIN (5)

## 2024-04-06 NOTE — PATIENT INSTRUCTIONS
Immodium over the counter if needed for diarrhea.     Lesions considered contagious if fluid filled and if your developing more.

## 2024-04-06 NOTE — PROGRESS NOTES
Sanjay Hoskins  1985    ASSESSMENT/PLAN:   1. Disseminated herpes zoster    Physical exam, rash consistent with shingles.  Will treat with valacyclovir as noted below.  Tylenol, ibuprofen as needed for pain.  Contagious if lesions are fluid-filled or developing new lesions.  Follow-up for any new or worsening concerns    - valACYclovir (VALTREX) 1000 mg tablet; Take 1 tablet (1,000 mg) by mouth 3 times daily for 7 days  Dispense: 21 tablet; Refill: 0     Patient agrees with plan of care and verbalizes understating. AVS printed. Patient education provided verbally and written instructions provided as requested. Patient made aware of emergent signs and symptoms to monitor for and when to seek additional care/follow up.     SUBJECTIVE:   CHIEF COMPLAINT/ REASON FOR VISIT  Patient presents with:  Derm Problem: Rash oh left side      HISTORY OF PRESENT ILLNESS  Sanjay Hoskins is a pleasant 38 year old male presents [facility] today with concerns of a rash to left side mid abdomen extending across the abdomen into his back, following dermatome pattern.  He noticed pain and aching on Thursday and Friday.  Late Friday developed a rash.  He has no other concerns, is otherwise feeling well      I have reviewed the nursing notes.  I have reviewed allergies, medication list, problem list, and past medical history.    REVIEW OF SYSTEMS  See HPI    VITAL SIGNS  Vitals:    04/06/24 0913 04/06/24 0915   BP: (!) 142/90 (!) 142/90   BP Location: Left arm    Patient Position: Sitting    Cuff Size: Adult Large    Pulse: 75    Resp: 16    Temp: 98.6  F (37  C)    TempSrc: Temporal    SpO2: 98%    Weight: 94.8 kg (208 lb 14.4 oz)       Body mass index is 31.07 kg/m .    OBJECTIVE:   PHYSICAL EXAM  Physical Exam  Vitals reviewed.   Constitutional:       Appearance: Normal appearance.   Skin:     General: Skin is warm.      Findings: Rash (Pustule filled lesions mid abdomen extending into back, following dermatome, not crossing  midline) present.   Neurological:      Mental Status: He is alert.          DIAGNOSTICS  No results found for any visits on 04/06/24.     ALTON Ross CNP  [facility]

## 2024-05-04 ENCOUNTER — HEALTH MAINTENANCE LETTER (OUTPATIENT)
Age: 39
End: 2024-05-04

## 2024-07-22 DIAGNOSIS — M25.571 PAIN IN JOINT INVOLVING ANKLE AND FOOT, RIGHT: ICD-10-CM

## 2024-07-23 NOTE — TELEPHONE ENCOUNTER
[facility] sent Rx request for the following:      Requested Prescriptions   Pending Prescriptions Disp Refills    naproxen (NAPROSYN) 500 MG tablet [Pharmacy Med Name: naproxen 500 mg tablet] 60 tablet 0     Sig: Take 1 tablet (500 mg) by mouth 2 times daily (with meals)       NSAID Medications Failed - 7/23/2024  3:35 PM     Last Prescription Date:   1/24/24  Last Fill Qty/Refills:         60, R-0    Last Office Visit:              8/24/23   Future Office visit:            none    Corry Garay RN on 7/23/2024 at 3:36 PM

## 2024-07-24 RX ORDER — NAPROXEN 500 MG/1
500 TABLET ORAL 2 TIMES DAILY WITH MEALS
Qty: 60 TABLET | Refills: 0 | Status: SHIPPED | OUTPATIENT
Start: 2024-07-24

## 2024-08-05 ENCOUNTER — TRANSFERRED RECORDS (OUTPATIENT)
Dept: MULTI SPECIALTY CLINIC | Facility: CLINIC | Age: 39
End: 2024-08-05
Payer: COMMERCIAL

## 2024-08-05 LAB — RETINOPATHY: NORMAL

## 2024-08-12 ENCOUNTER — OFFICE VISIT (OUTPATIENT)
Dept: FAMILY MEDICINE | Facility: OTHER | Age: 39
End: 2024-08-12
Attending: PHYSICIAN ASSISTANT
Payer: COMMERCIAL

## 2024-08-12 VITALS
SYSTOLIC BLOOD PRESSURE: 160 MMHG | WEIGHT: 201.8 LBS | OXYGEN SATURATION: 98 % | HEART RATE: 95 BPM | TEMPERATURE: 98.2 F | DIASTOLIC BLOOD PRESSURE: 118 MMHG | BODY MASS INDEX: 30.02 KG/M2

## 2024-08-12 DIAGNOSIS — M77.11 BILATERAL TENNIS ELBOW: Primary | ICD-10-CM

## 2024-08-12 DIAGNOSIS — M77.12 BILATERAL TENNIS ELBOW: Primary | ICD-10-CM

## 2024-08-12 PROCEDURE — 99213 OFFICE O/P EST LOW 20 MIN: CPT | Performed by: PHYSICIAN ASSISTANT

## 2024-08-12 ASSESSMENT — PAIN SCALES - GENERAL: PAINLEVEL: EXTREME PAIN (8)

## 2024-08-12 NOTE — PROGRESS NOTES
"  Assessment & Plan   Problem List Items Addressed This Visit    None  Visit Diagnoses       Bilateral tennis elbow    -  Primary           Bilateral tennis elbow:  Encouraged to take ibuprofen (800 mg) for relief up to 3 times per day OR naproxen twice daily.  Encouraged rest, stretching and elevation.  Encouraged to use ice or heat 15 minutes at a time several times per day to decrease pain. Return to clinic in 1-2 weeks as necessary for persistent pain. Return to clinic with any change or worsening of symptoms.   Gave stretching information.  Use tennis elbow bands with activity.    Increase activity as tolerated.  Offered occupational therapy referral however patient declined at this time.  Can call for referral preferred.  Can also call for referral to orthopedicd if needed.     BMI  Estimated body mass index is 30.02 kg/m  as calculated from the following:    Height as of 8/24/23: 1.746 m (5' 8.75\").    Weight as of this encounter: 91.5 kg (201 lb 12.8 oz).       See Patient Instructions    No follow-ups on file.      Samra Grace is a 38 year old, presenting for the following health issues:  Musculoskeletal Problem (Bilateral elbow)        8/12/2024     2:56 PM   Additional Questions   Roomed by Penny KANG CMA     Via the Health Maintenance questionnaire, the patient has reported the following services have been completed -Eye Exam: Eye Q 2024-08-05, this information has been sent to the abstraction team.  History of Present Illness       Reason for visit:  Elbows    He eats 0-1 servings of fruits and vegetables daily.He consumes 0 sweetened beverage(s) daily.He exercises with enough effort to increase his heart rate 9 or less minutes per day.  He exercises with enough effort to increase his heart rate 3 or less days per week.   He is taking medications regularly.     Pain History:  When did you first notice your pain? april   Have you seen anyone else for your pain? No  How has your pain affected your " ability to work? 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 Musculoskeletal problem/pain  Onset/Duration: april  Description  Location: elbow - bilateral  Joint Swelling: No  Redness: No  Pain: YES  Warmth: No  Intensity:  severe  Progression of Symptoms:  worsening  Accompanying signs and symptoms:   Fevers: No  Numbness/tingling/weakness: No  History  Trauma to the area: No  Recent illness:  No  Previous similar problem: No  Previous evaluation:  No  Precipitating or alleviating factors:  Aggravating factors include: overuse  Therapies tried and outcome: immobilization, stretching, and Ibuprofen    Patient struggling with bilateral elbow pain.  Pain is present on the ulnar grooves bilaterally along with the bones.  Shooting pains.  Hard to grafting's.  Increased pain with squeezing and holding.  Left arm is worse.  Tends to [place] with his left arm and has to push buttons for work.  In April he was taking tires off a car at home.  Was hammering a lot.  The next few days his arms were sore.  They then flared up.  Has not tried ice or heat.  Using ibuprofen and naproxen which helps.  Getting shooting pains.  Not getting better.  Pain with pushing on his arms.  Hard time picking up things.    Review of Systems  Constitutional, HEENT, cardiovascular, pulmonary, gi and gu systems are negative, except as otherwise noted.      Objective    BP (!) 160/118   Pulse 95   Temp 98.2  F (36.8  C) (Tympanic)   Wt 91.5 kg (201 lb 12.8 oz)   SpO2 98%   BMI 30.02 kg/m    Body mass index is 30.02 kg/m .  Physical Exam  Vitals and nursing note reviewed.   Constitutional:       Appearance: Normal appearance.   Musculoskeletal:         General: No swelling or signs of injury. Normal range of motion.      Comments: Mild pain to palpation over the bilateral ulnar groove and left lateral elbow that radiates down to the dorsal mid lower arm..   No bruising or swelling appreciated.  Pain in left elbow with flexion.  Otherwise unremarkable range of motion.   Skin:     General: Skin is warm and dry.   Neurological:      General: No focal deficit present.      Mental Status: He is alert and oriented to person, place, and time.      Gait: Gait normal.   Psychiatric:         Mood and Affect: Mood normal.         Behavior: Behavior normal.            No results found for any visits on 08/12/24.        Signed Electronically by: Sujey Monet PA-C

## 2024-08-12 NOTE — PATIENT INSTRUCTIONS
Encouraged to take ibuprofen (800 mg) for relief up to 3 times per day OR naproxen twice daily.  Encouraged rest, stretching and elevation.  Encouraged to use ice or heat 15 minutes at a time several times per day to decrease pain. Return to clinic in 1-2 weeks as necessary for persistent pain. Return to clinic with any change or worsening of symptoms.   Use tennis elbow bands with activity.

## 2024-08-12 NOTE — NURSING NOTE
"Chief Complaint   Patient presents with    Musculoskeletal Problem     Bilateral elbow     Patient has bilateral elbow pain. Has gotten worse recently with activity. Also states that he doesn't regularly take his BP and cholesterol medications. Blood pressure is elevated today.  Initial BP (!) 160/118   Pulse 95   Temp 98.2  F (36.8  C) (Tympanic)   Wt 91.5 kg (201 lb 12.8 oz)   SpO2 98%   BMI 30.02 kg/m   Estimated body mass index is 30.02 kg/m  as calculated from the following:    Height as of 8/24/23: 1.746 m (5' 8.75\").    Weight as of this encounter: 91.5 kg (201 lb 12.8 oz).  Medication Review: complete    The next two questions are to help us understand your food security.  If you are feeling you need any assistance in this area, we have resources available to support you today.          8/12/2024   SDOH- Food Insecurity   Within the past 12 months, did you worry that your food would run out before you got money to buy more? N   Within the past 12 months, did the food you bought just not last and you didn t have money to get more? N            Health Care Directive:  Patient does not have a Health Care Directive or Living Will: Discussed advance care planning with patient; information given to patient to review.    Penny Lopez MA      "

## 2024-10-03 DIAGNOSIS — E11.42 CONTROLLED TYPE 2 DIABETES MELLITUS WITH DIABETIC POLYNEUROPATHY, WITHOUT LONG-TERM CURRENT USE OF INSULIN (H): ICD-10-CM

## 2024-10-08 RX ORDER — GLIPIZIDE AND METFORMIN HCL 2.5; 5 MG/1; MG/1
TABLET, FILM COATED ORAL
Qty: 360 TABLET | Refills: 0 | Status: SHIPPED | OUTPATIENT
Start: 2024-10-08 | End: 2024-11-06

## 2024-10-08 NOTE — TELEPHONE ENCOUNTER
[facility] sent Rx request for the following:      Requested Prescriptions   Pending Prescriptions Disp Refills    glipiZIDE-metFORMIN (METAGLIP) 2.5-500 MG tablet [Pharmacy Med Name: glipizide 2.5 mg-metformin 500 mg tablet] 372 tablet 4     Sig: TAKE 2 TABLETS BY MOUTH 2 TIMES DAILY (BEFORE MEALS)       Combination Oral Antihyperglycemic Agents Failed - 10/8/2024  2:51 PM        Failed - Patient has documented A1c within the specified period of time.     If HgbA1C is 8 or greater, it needs to be on file within the past 3 months.  If less than 8, must be on file within the past 6 months.   Recent Labs   Lab Test 08/24/23  1542 06/04/18  1403 09/07/17  1557   A1C 7.0*   < >  --    [id number]  --   --  5.6    < > = values in this interval not displayed.           Failed - Patient's CR is NOT>1.4 OR Patient's EGFR is NOT<45 within past 12 mos.     Recent Labs   Lab Test 08/24/23  1542 06/24/22  1445 04/13/21  1055   GFRESTIMATED >90   < > >90   GFRESTBLACK  --   --  >90    < > = values in this interval not displayed.     Recent Labs   Lab Test 08/24/23  1542   CR 0.84           Failed - Recent (6 mo) or future (30 days) visit within the authorizing provider's specialty     Last Prescription Date:   8/4/23  Last Fill Qty/Refills:         372, R-4    Last Office Visit:                8/12/24 8/24/23 (Px)    Future Office visit:           None    Pt due for annual exam. Routing to provider for refill consideration. Routing to Unit scheduling pool, to assist Pt in scheduling appointment. Unable to complete prescription refill per RN Medication Refill Policy. Lakshmi Fierro RN .............. 10/8/2024  2:58 PM

## 2024-10-11 ENCOUNTER — PATIENT OUTREACH (OUTPATIENT)
Dept: FAMILY MEDICINE | Facility: OTHER | Age: 39
End: 2024-10-11
Payer: COMMERCIAL

## 2024-10-11 NOTE — TELEPHONE ENCOUNTER
Patient Quality Outreach    Patient is due for the following:   Physical Preventive Adult Physical    Next Steps:   Schedule a Adult Preventative    Type of outreach:    Message sent to outreach to schedule physical.    Questions for provider review:    None           Sylvia Woods RN

## 2024-11-06 ENCOUNTER — OFFICE VISIT (OUTPATIENT)
Dept: FAMILY MEDICINE | Facility: OTHER | Age: 39
End: 2024-11-06
Attending: STUDENT IN AN ORGANIZED HEALTH CARE EDUCATION/TRAINING PROGRAM
Payer: COMMERCIAL

## 2024-11-06 VITALS
RESPIRATION RATE: 18 BRPM | HEIGHT: 69 IN | DIASTOLIC BLOOD PRESSURE: 92 MMHG | SYSTOLIC BLOOD PRESSURE: 148 MMHG | OXYGEN SATURATION: 96 % | HEART RATE: 83 BPM | WEIGHT: 201.38 LBS | TEMPERATURE: 97.2 F | BODY MASS INDEX: 29.83 KG/M2

## 2024-11-06 DIAGNOSIS — E11.40 PAINFUL DIABETIC NEUROPATHY (H): ICD-10-CM

## 2024-11-06 DIAGNOSIS — I10 BENIGN ESSENTIAL HYPERTENSION: ICD-10-CM

## 2024-11-06 DIAGNOSIS — E11.42 CONTROLLED TYPE 2 DIABETES MELLITUS WITH DIABETIC POLYNEUROPATHY, WITHOUT LONG-TERM CURRENT USE OF INSULIN (H): ICD-10-CM

## 2024-11-06 DIAGNOSIS — R79.89 ELEVATED TSH: ICD-10-CM

## 2024-11-06 DIAGNOSIS — R74.8 ELEVATED LIVER ENZYMES: ICD-10-CM

## 2024-11-06 DIAGNOSIS — E78.2 MIXED HYPERLIPIDEMIA: ICD-10-CM

## 2024-11-06 DIAGNOSIS — Z00.00 ROUTINE GENERAL MEDICAL EXAMINATION AT A HEALTH CARE FACILITY: Primary | ICD-10-CM

## 2024-11-06 LAB
ALBUMIN SERPL BCG-MCNC: 4.8 G/DL (ref 3.5–5.2)
ALP SERPL-CCNC: 77 U/L (ref 40–150)
ALT SERPL W P-5'-P-CCNC: 72 U/L (ref 0–70)
ANION GAP SERPL CALCULATED.3IONS-SCNC: 10 MMOL/L (ref 7–15)
AST SERPL W P-5'-P-CCNC: 49 U/L (ref 0–45)
BASOPHILS # BLD AUTO: 0.1 10E3/UL (ref 0–0.2)
BASOPHILS NFR BLD AUTO: 1 %
BILIRUB SERPL-MCNC: 0.9 MG/DL
BUN SERPL-MCNC: 9.6 MG/DL (ref 6–20)
CALCIUM SERPL-MCNC: 9.3 MG/DL (ref 8.8–10.4)
CHLORIDE SERPL-SCNC: 96 MMOL/L (ref 98–107)
CHOLEST SERPL-MCNC: 210 MG/DL
CREAT SERPL-MCNC: 0.7 MG/DL (ref 0.67–1.17)
CREAT UR-MCNC: 77.9 MG/DL
EGFRCR SERPLBLD CKD-EPI 2021: >90 ML/MIN/1.73M2
EOSINOPHIL # BLD AUTO: 0.3 10E3/UL (ref 0–0.7)
EOSINOPHIL NFR BLD AUTO: 3 %
ERYTHROCYTE [DISTWIDTH] IN BLOOD BY AUTOMATED COUNT: 12.6 % (ref 10–15)
EST. AVERAGE GLUCOSE BLD GHB EST-MCNC: 143 MG/DL
FASTING STATUS PATIENT QL REPORTED: ABNORMAL
FASTING STATUS PATIENT QL REPORTED: ABNORMAL
GLUCOSE SERPL-MCNC: 93 MG/DL (ref 70–99)
HBA1C MFR BLD: 6.6 %
HCO3 SERPL-SCNC: 31 MMOL/L (ref 22–29)
HCT VFR BLD AUTO: 46.3 % (ref 40–53)
HDLC SERPL-MCNC: 51 MG/DL
HGB BLD-MCNC: 16.1 G/DL (ref 13.3–17.7)
IMM GRANULOCYTES # BLD: 0.1 10E3/UL
IMM GRANULOCYTES NFR BLD: 1 %
LDLC SERPL CALC-MCNC: 115 MG/DL
LYMPHOCYTES # BLD AUTO: 3.5 10E3/UL (ref 0.8–5.3)
LYMPHOCYTES NFR BLD AUTO: 35 %
MCH RBC QN AUTO: 28 PG (ref 26.5–33)
MCHC RBC AUTO-ENTMCNC: 34.8 G/DL (ref 31.5–36.5)
MCV RBC AUTO: 80 FL (ref 78–100)
MICROALBUMIN UR-MCNC: <12 MG/L
MICROALBUMIN/CREAT UR: NORMAL MG/G{CREAT}
MONOCYTES # BLD AUTO: 0.7 10E3/UL (ref 0–1.3)
MONOCYTES NFR BLD AUTO: 7 %
NEUTROPHILS # BLD AUTO: 5.4 10E3/UL (ref 1.6–8.3)
NEUTROPHILS NFR BLD AUTO: 54 %
NONHDLC SERPL-MCNC: 159 MG/DL
NRBC # BLD AUTO: 0 10E3/UL
NRBC BLD AUTO-RTO: 0 /100
PLATELET # BLD AUTO: 269 10E3/UL (ref 150–450)
POTASSIUM SERPL-SCNC: 3.9 MMOL/L (ref 3.4–5.3)
PROT SERPL-MCNC: 8 G/DL (ref 6.4–8.3)
RBC # BLD AUTO: 5.76 10E6/UL (ref 4.4–5.9)
SODIUM SERPL-SCNC: 137 MMOL/L (ref 135–145)
T4 FREE SERPL-MCNC: 1.2 NG/DL (ref 0.9–1.7)
TRIGL SERPL-MCNC: 220 MG/DL
TSH SERPL DL<=0.005 MIU/L-ACNC: 5.46 UIU/ML (ref 0.3–4.2)
WBC # BLD AUTO: 9.9 10E3/UL (ref 4–11)

## 2024-11-06 PROCEDURE — 99395 PREV VISIT EST AGE 18-39: CPT | Performed by: STUDENT IN AN ORGANIZED HEALTH CARE EDUCATION/TRAINING PROGRAM

## 2024-11-06 PROCEDURE — 84443 ASSAY THYROID STIM HORMONE: CPT | Mod: ZL | Performed by: STUDENT IN AN ORGANIZED HEALTH CARE EDUCATION/TRAINING PROGRAM

## 2024-11-06 PROCEDURE — 36415 COLL VENOUS BLD VENIPUNCTURE: CPT | Mod: ZL | Performed by: STUDENT IN AN ORGANIZED HEALTH CARE EDUCATION/TRAINING PROGRAM

## 2024-11-06 PROCEDURE — 83036 HEMOGLOBIN GLYCOSYLATED A1C: CPT | Mod: ZL | Performed by: STUDENT IN AN ORGANIZED HEALTH CARE EDUCATION/TRAINING PROGRAM

## 2024-11-06 PROCEDURE — 82465 ASSAY BLD/SERUM CHOLESTEROL: CPT | Mod: ZL | Performed by: STUDENT IN AN ORGANIZED HEALTH CARE EDUCATION/TRAINING PROGRAM

## 2024-11-06 PROCEDURE — 84439 ASSAY OF FREE THYROXINE: CPT | Mod: ZL | Performed by: STUDENT IN AN ORGANIZED HEALTH CARE EDUCATION/TRAINING PROGRAM

## 2024-11-06 PROCEDURE — 85004 AUTOMATED DIFF WBC COUNT: CPT | Mod: ZL | Performed by: STUDENT IN AN ORGANIZED HEALTH CARE EDUCATION/TRAINING PROGRAM

## 2024-11-06 PROCEDURE — 82310 ASSAY OF CALCIUM: CPT | Mod: ZL | Performed by: STUDENT IN AN ORGANIZED HEALTH CARE EDUCATION/TRAINING PROGRAM

## 2024-11-06 PROCEDURE — 99207 PR FOOT EXAM NO CHARGE: CPT | Performed by: STUDENT IN AN ORGANIZED HEALTH CARE EDUCATION/TRAINING PROGRAM

## 2024-11-06 PROCEDURE — 99214 OFFICE O/P EST MOD 30 MIN: CPT | Mod: 25 | Performed by: STUDENT IN AN ORGANIZED HEALTH CARE EDUCATION/TRAINING PROGRAM

## 2024-11-06 PROCEDURE — 82043 UR ALBUMIN QUANTITATIVE: CPT | Mod: ZL | Performed by: STUDENT IN AN ORGANIZED HEALTH CARE EDUCATION/TRAINING PROGRAM

## 2024-11-06 RX ORDER — OXYCODONE HYDROCHLORIDE 5 MG/1
5 TABLET ORAL EVERY 6 HOURS PRN
COMMUNITY
Start: 2024-11-04

## 2024-11-06 RX ORDER — CHLORHEXIDINE GLUCONATE ORAL RINSE 1.2 MG/ML
SOLUTION DENTAL
COMMUNITY
Start: 2024-11-04

## 2024-11-06 RX ORDER — IBUPROFEN 800 MG/1
TABLET, FILM COATED ORAL
COMMUNITY
Start: 2024-11-04

## 2024-11-06 RX ORDER — LISINOPRIL 10 MG/1
10 TABLET ORAL DAILY
Qty: 90 TABLET | Refills: 4 | Status: SHIPPED | OUTPATIENT
Start: 2024-11-06

## 2024-11-06 RX ORDER — PRAVASTATIN SODIUM 40 MG
40 TABLET ORAL AT BEDTIME
Qty: 90 TABLET | Refills: 4 | Status: SHIPPED | OUTPATIENT
Start: 2024-11-06

## 2024-11-06 RX ORDER — GLIPIZIDE AND METFORMIN HCL 2.5; 5 MG/1; MG/1
1 TABLET, FILM COATED ORAL
Qty: 180 TABLET | Refills: 4 | Status: SHIPPED | OUTPATIENT
Start: 2024-11-06

## 2024-11-06 ASSESSMENT — PATIENT HEALTH QUESTIONNAIRE - PHQ9
SUM OF ALL RESPONSES TO PHQ QUESTIONS 1-9: 0
10. IF YOU CHECKED OFF ANY PROBLEMS, HOW DIFFICULT HAVE THESE PROBLEMS MADE IT FOR YOU TO DO YOUR WORK, TAKE CARE OF THINGS AT HOME, OR GET ALONG WITH OTHER PEOPLE: NOT DIFFICULT AT ALL
SUM OF ALL RESPONSES TO PHQ QUESTIONS 1-9: 0

## 2024-11-06 ASSESSMENT — PAIN SCALES - GENERAL: PAINLEVEL_OUTOF10: NO PAIN (0)

## 2024-11-06 NOTE — NURSING NOTE
"Chief Complaint   Patient presents with    Physical     Annual exam       Initial BP (!) 148/92 (BP Location: Right arm, Patient Position: Sitting, Cuff Size: Adult Regular)   Pulse 83   Temp 97.2  F (36.2  C) (Tympanic)   Resp 18   Ht 1.753 m (5' 9\")   Wt 91.3 kg (201 lb 6 oz)   SpO2 96%   BMI 29.74 kg/m   Estimated body mass index is 29.74 kg/m  as calculated from the following:    Height as of this encounter: 1.753 m (5' 9\").    Weight as of this encounter: 91.3 kg (201 lb 6 oz).  Medication Review: complete    The next two questions are to help us understand your food security.  If you are feeling you need any assistance in this area, we have resources available to support you today.          8/12/2024   SDOH- Food Insecurity   Within the past 12 months, did you worry that your food would run out before you got money to buy more? N   Within the past 12 months, did the food you bought just not last and you didn t have money to get more? N            Health Care Directive:  Patient does not have a Health Care Directive: Discussed advance care planning with patient; however, patient declined at this time.    Lakshmi Pierre LPN      "

## 2024-11-06 NOTE — PROGRESS NOTES
Preventive Care Visit  [facility]  Richar Shirley MD, Family Medicine  Nov 6, 2024      Assessment & Plan     Routine general medical examination at a health care facility  - CBC and Differential; Future  - Comprehensive Metabolic Panel; Future  - Lipid Panel; Future  - TSH Reflex GH; Future  - CBC and Differential  - Comprehensive Metabolic Panel  - Lipid Panel  - TSH Reflex GH    Painful diabetic neuropathy (H)  Comes and goes. Has tried gabapentin but had side effects     Controlled type 2 diabetes mellitus with diabetic polyneuropathy, without long-term current use of insulin (H)  Well controlled. Refilled   - Hemoglobin A1c; Future  - Albumin Random Urine Quantitative with Creat Ratio; Future  - Hemoglobin A1c  - Albumin Random Urine Quantitative with Creat Ratio  - glipiZIDE-metFORMIN (METAGLIP) 2.5-500 MG tablet; Take 1 tablet by mouth 2 times daily (before meals).  - FOOT EXAM    Benign essential hypertension  Not well controlled. Increase lisinopril from 5 mg to 10 mg daily.   - lisinopril (ZESTRIL) 10 MG tablet; Take 1 tablet (10 mg) by mouth daily.    Mixed hyperlipidemia  Refilled   - pravastatin (PRAVACHOL) 40 MG tablet; Take 1 tablet (40 mg) by mouth at bedtime. For Cholesterol and Heart Attack Risk Reduction                Return in about 53 weeks (around 11/12/2025) for Annual Wellness Visit.    Samra Grace is a 39 year old, presenting for the following:  Physical (Annual exam)        8/12/2024     2:56 PM   Additional Questions   Roomed by Penny REYM  Doing well   Due for diabetes check/refills  Due for labs           Health Care Directive  Patient does not have a Health Care Directive: Discussed advance care planning with patient; however, patient declined at this time.       No data to display                  8/24/2023   Nutrition   Three or more servings of calcium each day? Yes   Diet: diabetic            8/24/2023   Exercise   Frequency of  "exercise: None               2023   Dental   Dentist two times every year? Yes             Today's PHQ-9 Score:       2024     3:17 PM   PHQ-9 SCORE   PHQ-9 Total Score MyChart 0   PHQ-9 Total Score 0        Patient-reported         2023   Substance Use   Alcohol more than 3/day or more than 7/wk Yes   How often do you have a drink containing alcohol Monthly or less   How many alcohol drinks on typical day 3 or 4   How often do you have 5+ drinks at one occasion Less than monthly   Audit 2/3 Score 2   How often not able to stop drinking once started Never   How often failed to do what normally expected Never   How often needed first drink in am after a heavy drinking session Never   How often feeling of guilt or remorse after drinking Never   How often unable to remember what happened the night before Never   Have you or someone else been injured because of your drinking No   Has anyone been concerned or suggested you cut down on drinking No   TOTAL SCORE - AUDIT 3        Social History     Tobacco Use    Smoking status: Former     Current packs/day: 0.00     Average packs/day: 0.3 packs/day for 6.0 years (1.5 ttl pk-yrs)     Types: Cigarettes     Start date: 2012     Quit date: 2018     Years since quittin.5    Smokeless tobacco: Never   Vaping Use    Vaping status: Never Used   Substance Use Topics    Alcohol use: Yes     Alcohol/week: 6.0 standard drinks of alcohol     Comment: 1-2 times on the weekends    Drug use: Never              No data to display                 Reviewed and updated as needed this visit by Provider                    Lab work is in process      Review of Systems  Constitutional, HEENT, cardiovascular, pulmonary, gi and gu systems are negative, except as otherwise noted.     Objective    Exam  BP (!) 148/92 (BP Location: Right arm, Patient Position: Sitting, Cuff Size: Adult Regular)   Pulse 83   Temp 97.2  F (36.2  C) (Tympanic)   Resp 18   Ht 1.753 m (5' 9\") " "  Wt 91.3 kg (201 lb 6 oz)   SpO2 96%   BMI 29.74 kg/m     Estimated body mass index is 29.74 kg/m  as calculated from the following:    Height as of this encounter: 1.753 m (5' 9\").    Weight as of this encounter: 91.3 kg (201 lb 6 oz).    Physical Exam  GENERAL: alert and no distress  EYES: Eyes grossly normal to inspection, PERRL and conjunctivae and sclerae normal  HENT: ear canals and TM's normal, nose and mouth without ulcers or lesions  RESP: lungs clear to auscultation - no rales, rhonchi or wheezes  CV: regular rate and rhythm, normal S1 S2, no S3 or S4, no murmur, click or rub, no peripheral edema   MS: no gross musculoskeletal defects noted, no edema  SKIN: no suspicious lesions or rashes  PSYCH: mentation appears normal, affect normal/bright  Diabetic foot exam: normal DP and PT pulses, no trophic changes or ulcerative lesions, and normal sensory exam        Signed Electronically by: Richar Shirley MD  Answers submitted by the patient for this visit:  Patient Health Questionnaire (Submitted on 11/6/2024)  If you checked off any problems, how difficult have these problems made it for you to do your work, take care of things at home, or get along with other people?: Not difficult at all  PHQ9 TOTAL SCORE: 0    "

## 2024-11-06 NOTE — PATIENT INSTRUCTIONS
Patient Education   Preventive Care Advice   This is general advice given by our system to help you stay healthy. However, your care team may have specific advice just for you. Please talk to your care team about your preventive care needs.  Nutrition  Eat 5 or more servings of fruits and vegetables each day.  Try wheat bread, brown rice and whole grain pasta (instead of white bread, rice, and pasta).  Get enough calcium and vitamin D. Check the label on foods and aim for 100% of the RDA (recommended daily allowance).  Lifestyle  Exercise at least 150 minutes each week  (30 minutes a day, 5 days a week).  Do muscle strengthening activities 2 days a week. These help control your weight and prevent disease.  No smoking.  Wear sunscreen to prevent skin cancer.  Have a dental exam and cleaning every 6 months.  Yearly exams  See your health care team every year to talk about:  Any changes in your health.  Any medicines your care team has prescribed.  Preventive care, family planning, and ways to prevent chronic diseases.  Shots (vaccines)   HPV shots (up to age 26), if you've never had them before.  Hepatitis B shots (up to age 59), if you've never had them before.  COVID-19 shot: Get this shot when it's due.  Flu shot: Get a flu shot every year.  Tetanus shot: Get a tetanus shot every 10 years.  Pneumococcal, hepatitis A, and RSV shots: Ask your care team if you need these based on your risk.  Shingles shot (for age 50 and up)  General health tests  Diabetes screening:  Starting at age 35, Get screened for diabetes at least every 3 years.  If you are younger than age 35, ask your care team if you should be screened for diabetes.  Cholesterol test: At age 39, start having a cholesterol test every 5 years, or more often if advised.  Bone density scan (DEXA): At age 50, ask your care team if you should have this scan for osteoporosis (brittle bones).  Hepatitis C: Get tested at least once in your life.  STIs (sexually  transmitted infections)  Before age 24: Ask your care team if you should be screened for STIs.  After age 24: Get screened for STIs if you're at risk. You are at risk for STIs (including HIV) if:  You are sexually active with more than one person.  You don't use condoms every time.  You or a partner was diagnosed with a sexually transmitted infection.  If you are at risk for HIV, ask about PrEP medicine to prevent HIV.  Get tested for HIV at least once in your life, whether you are at risk for HIV or not.  Cancer screening tests  Cervical cancer screening: If you have a cervix, begin getting regular cervical cancer screening tests starting at age 21.  Breast cancer scan (mammogram): If you've ever had breasts, begin having regular mammograms starting at age 40. This is a scan to check for breast cancer.  Colon cancer screening: It is important to start screening for colon cancer at age 45.  Have a colonoscopy test every 10 years (or more often if you're at risk) Or, ask your provider about stool tests like a FIT test every year or Cologuard test every 3 years.  To learn more about your testing options, visit:   .  For help making a decision, visit:   https://bit.ly/pl91673.  Prostate cancer screening test: If you have a prostate, ask your care team if a prostate cancer screening test (PSA) at age 55 is right for you.  Lung cancer screening: If you are a current or former smoker ages 50 to 80, ask your care team if ongoing lung cancer screenings are right for you.  For informational purposes only. Not to replace the advice of your health care provider. Copyright   2023 [employer] All rights reserved. Clinically reviewed by the [facility] Transitions Program. [employer] [id number] - REV 01/24.

## 2024-11-07 NOTE — RESULT ENCOUNTER NOTE
Please call patient, I'm not sure he can access his mychart--   Here are my comments about the recent results. TSH (thyroid hormone) slightly elevated but T4 normal. This means we should recheck in about 6 weeks to see where the levels are at.   AST and ALT are elevated, these are liver enzyme labs. I am ordering an ultrasound of your liver to further assess and adding on a few more labs.   Labs are all ordered as future, you just need to schedule a lab only visit to recheck all in 6 weeks.   Cholesterol levels are elevated.   Diabetes labs look good at 6.6.

## 2024-12-02 ENCOUNTER — HOSPITAL ENCOUNTER (OUTPATIENT)
Dept: ULTRASOUND IMAGING | Facility: OTHER | Age: 39
Discharge: HOME | End: 2024-12-02
Attending: STUDENT IN AN ORGANIZED HEALTH CARE EDUCATION/TRAINING PROGRAM | Admitting: STUDENT IN AN ORGANIZED HEALTH CARE EDUCATION/TRAINING PROGRAM
Payer: COMMERCIAL

## 2024-12-02 DIAGNOSIS — R74.8 ELEVATED LIVER ENZYMES: ICD-10-CM

## 2024-12-02 PROCEDURE — 76705 ECHO EXAM OF ABDOMEN: CPT

## 2024-12-19 ENCOUNTER — LAB (OUTPATIENT)
Dept: LAB | Facility: OTHER | Age: 39
End: 2024-12-19
Attending: STUDENT IN AN ORGANIZED HEALTH CARE EDUCATION/TRAINING PROGRAM
Payer: COMMERCIAL

## 2024-12-19 DIAGNOSIS — R74.8 ELEVATED LIVER ENZYMES: ICD-10-CM

## 2024-12-19 DIAGNOSIS — R79.89 ELEVATED TSH: ICD-10-CM

## 2024-12-19 LAB — TSH SERPL DL<=0.005 MIU/L-ACNC: 1.59 UIU/ML (ref 0.3–4.2)

## 2024-12-19 PROCEDURE — 84443 ASSAY THYROID STIM HORMONE: CPT | Mod: ZL

## 2024-12-19 PROCEDURE — 86803 HEPATITIS C AB TEST: CPT | Mod: ZL

## 2024-12-19 PROCEDURE — 86706 HEP B SURFACE ANTIBODY: CPT | Mod: ZL

## 2024-12-19 PROCEDURE — 36415 COLL VENOUS BLD VENIPUNCTURE: CPT | Mod: ZL

## 2024-12-19 PROCEDURE — 87340 HEPATITIS B SURFACE AG IA: CPT | Mod: ZL

## 2024-12-20 LAB
HBV SURFACE AB SERPL IA-ACNC: <3.5 M[IU]/ML
HBV SURFACE AB SERPL IA-ACNC: NONREACTIVE M[IU]/ML
HBV SURFACE AG SERPL QL IA: NONREACTIVE
HCV AB SERPL QL IA: NONREACTIVE

## 2025-04-14 DIAGNOSIS — M25.571 PAIN IN JOINT INVOLVING ANKLE AND FOOT, RIGHT: ICD-10-CM

## 2025-04-14 DIAGNOSIS — R12 HEARTBURN: ICD-10-CM

## 2025-04-16 RX ORDER — NAPROXEN 500 MG/1
500 TABLET ORAL 2 TIMES DAILY WITH MEALS
Qty: 60 TABLET | Refills: 4 | Status: SHIPPED | OUTPATIENT
Start: 2025-04-16

## 2025-04-16 RX ORDER — OMEPRAZOLE 20 MG/1
CAPSULE, DELAYED RELEASE ORAL
Qty: 90 CAPSULE | Refills: 4 | Status: SHIPPED | OUTPATIENT
Start: 2025-04-16

## 2025-04-16 NOTE — TELEPHONE ENCOUNTER
[facility] sent Rx request for the following:      Requested Prescriptions   Pending Prescriptions Disp Refills    omeprazole (PRILOSEC) 20 MG DR capsule [Pharmacy Med Name: omeprazole 20 mg capsule,delayed release] 90 capsule 3     Sig: Take 1 capsule (20 mg) by mouth daily Before a meal   Last Prescription Date:   8/4/23  Last Fill Qty/Refills:         90, R-3        naproxen (NAPROSYN) 500 MG tablet [Pharmacy Med Name: naproxen 500 mg tablet] 60 tablet 0     Sig: Take 1 tablet (500 mg) by mouth 2 times daily (with meals)   Last Prescription Date:   7/24/24  Last Fill Qty/Refills:         60, R-0      NSAID Medications Failed - 4/16/2025  1:47 PM        Failed - Most recent blood pressure under 140/90 in past 12 months     BP Readings from Last 3 Encounters:   11/06/24 (!) 148/92   08/12/24 (!) 160/118   04/06/24 (!) 142/90   No data recorded        Failed - Always Fail Criteria - Chart Review Required     Last Office Visit:              11/6/24 (Px)  Future Office visit:           None    Per LOV note:  Return in about 53 weeks (around 11/12/2025) for Annual Wellness Visit.     Unable to complete prescription refill per RN Medication Refill Policy. Lakshmi Fierro, Refill RN .............. 4/16/2025  2:24 PM

## 2025-05-17 ENCOUNTER — HEALTH MAINTENANCE LETTER (OUTPATIENT)
Age: 40
End: 2025-05-17

## 2025-08-21 ENCOUNTER — LAB (OUTPATIENT)
Dept: LAB | Facility: OTHER | Age: 40
End: 2025-08-21
Attending: STUDENT IN AN ORGANIZED HEALTH CARE EDUCATION/TRAINING PROGRAM
Payer: COMMERCIAL

## 2025-08-21 ENCOUNTER — ALLIED HEALTH/NURSE VISIT (OUTPATIENT)
Dept: FAMILY MEDICINE | Facility: OTHER | Age: 40
End: 2025-08-21
Attending: STUDENT IN AN ORGANIZED HEALTH CARE EDUCATION/TRAINING PROGRAM
Payer: COMMERCIAL

## 2025-08-21 VITALS — TEMPERATURE: 97.8 F

## 2025-08-21 DIAGNOSIS — E78.2 MIXED HYPERLIPIDEMIA: ICD-10-CM

## 2025-08-21 DIAGNOSIS — I10 BENIGN ESSENTIAL HYPERTENSION: ICD-10-CM

## 2025-08-21 DIAGNOSIS — Z23 ENCOUNTER FOR IMMUNIZATION: Primary | ICD-10-CM

## 2025-08-21 DIAGNOSIS — E11.42 CONTROLLED TYPE 2 DIABETES MELLITUS WITH DIABETIC POLYNEUROPATHY, WITHOUT LONG-TERM CURRENT USE OF INSULIN (H): ICD-10-CM

## 2025-08-21 LAB
ALBUMIN SERPL BCG-MCNC: 4.4 G/DL (ref 3.5–5.2)
ALP SERPL-CCNC: 89 U/L (ref 40–150)
ALT SERPL W P-5'-P-CCNC: 58 U/L (ref 0–70)
ANION GAP SERPL CALCULATED.3IONS-SCNC: 11 MMOL/L (ref 7–15)
AST SERPL W P-5'-P-CCNC: 28 U/L (ref 0–45)
BASOPHILS # BLD AUTO: <0.03 10E3/UL (ref 0–0.2)
BASOPHILS NFR BLD AUTO: 0.3 %
BILIRUB SERPL-MCNC: 1.7 MG/DL
BUN SERPL-MCNC: 16.6 MG/DL (ref 6–20)
CALCIUM SERPL-MCNC: 9.3 MG/DL (ref 8.8–10.4)
CHLORIDE SERPL-SCNC: 99 MMOL/L (ref 98–107)
CHOLEST SERPL-MCNC: 190 MG/DL
CREAT SERPL-MCNC: 0.98 MG/DL (ref 0.67–1.17)
EGFRCR SERPLBLD CKD-EPI 2021: >90 ML/MIN/1.73M2
EOSINOPHIL # BLD AUTO: 0.21 10E3/UL (ref 0–0.7)
EOSINOPHIL NFR BLD AUTO: 3.3 %
ERYTHROCYTE [DISTWIDTH] IN BLOOD BY AUTOMATED COUNT: 12.3 % (ref 10–15)
EST. AVERAGE GLUCOSE BLD GHB EST-MCNC: 200 MG/DL
FASTING STATUS PATIENT QL REPORTED: ABNORMAL
FASTING STATUS PATIENT QL REPORTED: ABNORMAL
GLUCOSE SERPL-MCNC: 265 MG/DL (ref 70–99)
HBA1C MFR BLD: 8.6 %
HCO3 SERPL-SCNC: 28 MMOL/L (ref 22–29)
HCT VFR BLD AUTO: 41.9 % (ref 40–53)
HDLC SERPL-MCNC: 57 MG/DL
HGB BLD-MCNC: 15 G/DL (ref 13.3–17.7)
IMM GRANULOCYTES # BLD: <0.03 10E3/UL
IMM GRANULOCYTES NFR BLD: 0.2 %
LDLC SERPL CALC-MCNC: 96 MG/DL
LYMPHOCYTES # BLD AUTO: 1.91 10E3/UL (ref 0.8–5.3)
LYMPHOCYTES NFR BLD AUTO: 30.3 %
MCH RBC QN AUTO: 29.8 PG (ref 26.5–33)
MCHC RBC AUTO-ENTMCNC: 35.8 G/DL (ref 31.5–36.5)
MCV RBC AUTO: 83.3 FL (ref 78–100)
MONOCYTES # BLD AUTO: 0.49 10E3/UL (ref 0–1.3)
MONOCYTES NFR BLD AUTO: 7.8 %
NEUTROPHILS # BLD AUTO: 3.67 10E3/UL (ref 1.6–8.3)
NEUTROPHILS NFR BLD AUTO: 58.1 %
NONHDLC SERPL-MCNC: 133 MG/DL
NRBC # BLD AUTO: <0.03 10E3/UL
NRBC BLD AUTO-RTO: 0 /100
PLATELET # BLD AUTO: 212 10E3/UL (ref 150–450)
POTASSIUM SERPL-SCNC: 4.1 MMOL/L (ref 3.4–5.3)
PROT SERPL-MCNC: 7.1 G/DL (ref 6.4–8.3)
RBC # BLD AUTO: 5.03 10E6/UL (ref 4.4–5.9)
SODIUM SERPL-SCNC: 138 MMOL/L (ref 135–145)
TRIGL SERPL-MCNC: 185 MG/DL
TSH SERPL DL<=0.005 MIU/L-ACNC: 2.46 UIU/ML (ref 0.3–4.2)
WBC # BLD AUTO: 6.31 10E3/UL (ref 4–11)

## 2025-08-21 PROCEDURE — 84443 ASSAY THYROID STIM HORMONE: CPT | Mod: ZL

## 2025-08-21 PROCEDURE — 85004 AUTOMATED DIFF WBC COUNT: CPT | Mod: ZL

## 2025-08-21 PROCEDURE — 84155 ASSAY OF PROTEIN SERUM: CPT | Mod: ZL

## 2025-08-21 PROCEDURE — 82465 ASSAY BLD/SERUM CHOLESTEROL: CPT | Mod: ZL

## 2025-08-21 PROCEDURE — 83036 HEMOGLOBIN GLYCOSYLATED A1C: CPT | Mod: ZL

## 2025-08-21 PROCEDURE — 36415 COLL VENOUS BLD VENIPUNCTURE: CPT | Mod: ZL

## 2025-08-22 ENCOUNTER — TELEPHONE (OUTPATIENT)
Dept: FAMILY MEDICINE | Facility: OTHER | Age: 40
End: 2025-08-22
Payer: COMMERCIAL

## 2025-08-22 DIAGNOSIS — Z87.39 HISTORY OF NECK PAIN: ICD-10-CM

## 2025-08-22 RX ORDER — CYCLOBENZAPRINE HCL 10 MG
TABLET ORAL
Qty: 30 TABLET | Refills: 0 | Status: SHIPPED | OUTPATIENT
Start: 2025-08-22

## (undated) RX ORDER — CYANOCOBALAMIN 1000 UG/ML
INJECTION, SOLUTION INTRAMUSCULAR; SUBCUTANEOUS
Status: DISPENSED
Start: 2021-04-13

## (undated) RX ORDER — CLINDAMYCIN HCL 150 MG
CAPSULE ORAL
Status: DISPENSED
Start: 2018-08-18